# Patient Record
Sex: FEMALE | URBAN - METROPOLITAN AREA
[De-identification: names, ages, dates, MRNs, and addresses within clinical notes are randomized per-mention and may not be internally consistent; named-entity substitution may affect disease eponyms.]

---

## 2017-01-13 ENCOUNTER — APPOINTMENT (OUTPATIENT)
Dept: URBAN - METROPOLITAN AREA CLINIC 211 | Age: 52
Setting detail: DERMATOLOGY
End: 2017-01-13

## 2017-01-13 DIAGNOSIS — Z41.9 ENCOUNTER FOR PROCEDURE FOR PURPOSES OTHER THAN REMEDYING HEALTH STATE, UNSPECIFIED: ICD-10-CM

## 2017-01-13 DIAGNOSIS — L70.0 ACNE VULGARIS: ICD-10-CM

## 2017-01-13 PROCEDURE — OTHER OTHER (COSMETIC): OTHER

## 2017-01-13 PROCEDURE — OTHER ACNE SURGERY: OTHER

## 2017-01-13 PROCEDURE — OTHER MIPS QUALITY: OTHER

## 2017-01-13 PROCEDURE — 10040 ACNE SURGERY: CPT

## 2017-01-13 ASSESSMENT — LOCATION DETAILED DESCRIPTION DERM: LOCATION DETAILED: LEFT MEDIAL SUPERIOR CHEST

## 2017-01-13 ASSESSMENT — LOCATION ZONE DERM: LOCATION ZONE: TRUNK

## 2017-01-13 ASSESSMENT — LOCATION SIMPLE DESCRIPTION DERM: LOCATION SIMPLE: CHEST

## 2017-01-13 NOTE — PROCEDURE: ACNE SURGERY
Render Post-Care Instructions In Note?: no
Consent was obtained and risks were reviewed including but not limited to scarring, infection, bleeding, scabbing, incomplete removal, and allergy to anesthesia.
Extraction Method: lancet and extractor
Acne Type: Comedonal Lesions
Detail Level: Zone
Post-Care Instructions: I reviewed with the patient in detail post-care instructions. Patient is to keep the treatment areaas dry overnight, and then apply bacitracin twice daily until healed. Patient may apply hydrogen peroxide soaks to remove any crusting.
Prep Text (Optional): Prior to removal the treatment areas were prepped in the usual fashion.\\nAcne Surgery with Shahid Acleara\\nIndication: improvement of open and closed comedones, papules, pustules, acne cysts\\nTreatment #\\nSite(s):\\nenergy:\\nvac level:\\npulse mode:\\npulse width/ms:\\nTotal Passes:\\n\\nAcne surgery with Lancet and Comedone Extractor:   Yes\\nIntralesional Injections with Kenalog preparation:      No                                          \\nConcentration: N/A\\nTotal Volume: N/A\\nProducts Applied: Pore Therapy and Cetaphil Oil Control Moisturizer w/SPF30\\n\\n\\Kim indications, treatment expectations (including management of any possible irritations), protocols, risks and benefits, pre/post care are reviewed. Details of these can be found on the appropriate attached informed consent documentation. Patient understands that multiple treatments may be necessary for optimum results and that ongoing maintenance with at-home products and additional office visits or treatments may be needed to enhance and extend the desired results.\\nStandard protocol was done. Reviewed with patient that ILS injections as well as manual extractions may be recommended for optimum results. These will be considered prn.  Following treatment, the expected mild erythema was observed. Patient tolerated the procedure well without immediate bruising or complication. Post care was reviewed.\\nRTC:

## 2017-01-13 NOTE — HPI: OTHER
Condition:: Cosmetic
Please Describe Your Condition:: Pt presents today for CherryBerry Facial and Acleara. Pt had no issues or concerns from previous treatment. Pt's medical history and medications are reviewed today.

## 2017-01-13 NOTE — PROCEDURE: MIPS QUALITY
Quality 130: Documentation Of Current Medications In The Medical Record: Current Medications Documented
Quality 431: Preventive Care And Screening: Unhealthy Alcohol Use - Screening: Patient screened for unhealthy alcohol use using a single question and scores less than 2 times per year
Detail Level: Detailed
Quality 226: Preventive Care And Screening: Tobacco Use: Screening And Cessation Intervention: Patient screened for tobacco and never smoked
Quality 131: Pain Assessment And Follow-Up: Pain assessment documented as positive using a standardized tool AND a follow-up plan is documented
Quality 110: Preventive Care And Screening: Influenza Immunization: Influenza Immunization not Administered because Patient Refused.

## 2017-02-13 ENCOUNTER — APPOINTMENT (OUTPATIENT)
Dept: URBAN - METROPOLITAN AREA CLINIC 211 | Age: 52
Setting detail: DERMATOLOGY
End: 2017-02-13

## 2017-02-13 DIAGNOSIS — L98.8 OTHER SPECIFIED DISORDERS OF THE SKIN AND SUBCUTANEOUS TISSUE: ICD-10-CM

## 2017-02-13 DIAGNOSIS — Z41.9 ENCOUNTER FOR PROCEDURE FOR PURPOSES OTHER THAN REMEDYING HEALTH STATE, UNSPECIFIED: ICD-10-CM

## 2017-02-13 PROBLEM — L90.8 OTHER ATROPHIC DISORDERS OF SKIN: Status: ACTIVE | Noted: 2017-02-13

## 2017-02-13 PROCEDURE — OTHER MIPS QUALITY: OTHER

## 2017-02-13 PROCEDURE — OTHER BOTOX: OTHER

## 2017-02-13 PROCEDURE — OTHER COUNSELING: OTHER

## 2017-02-13 PROCEDURE — OTHER OTHER (COSMETIC): OTHER

## 2017-02-13 ASSESSMENT — LOCATION DETAILED DESCRIPTION DERM
LOCATION DETAILED: LEFT CENTRAL EYEBROW
LOCATION DETAILED: LEFT MEDIAL EYEBROW
LOCATION DETAILED: RIGHT INFERIOR FOREHEAD
LOCATION DETAILED: GLABELLA

## 2017-02-13 ASSESSMENT — LOCATION SIMPLE DESCRIPTION DERM
LOCATION SIMPLE: GLABELLA
LOCATION SIMPLE: RIGHT FOREHEAD
LOCATION SIMPLE: LEFT EYEBROW

## 2017-02-13 ASSESSMENT — LOCATION ZONE DERM: LOCATION ZONE: FACE

## 2017-02-13 NOTE — PROCEDURE: MIPS QUALITY
Quality 226: Preventive Care And Screening: Tobacco Use: Screening And Cessation Intervention: Patient screened for tobacco and never smoked
Quality 130: Documentation Of Current Medications In The Medical Record: Current Medications Documented
Quality 131: Pain Assessment And Follow-Up: Pain assessment documented as positive using a standardized tool AND a follow-up plan is documented
Quality 110: Preventive Care And Screening: Influenza Immunization: Influenza Immunization Administered during Influenza season
Detail Level: Detailed
Quality 431: Preventive Care And Screening: Unhealthy Alcohol Use - Screening: Patient screened for unhealthy alcohol use using a single question and scores less than 2 times per year

## 2017-02-13 NOTE — PROCEDURE: MIPS QUALITY
Quality 110: Preventive Care And Screening: Influenza Immunization: Influenza Immunization Administered during Influenza season
Quality 130: Documentation Of Current Medications In The Medical Record: Current Medications Documented
Quality 431: Preventive Care And Screening: Unhealthy Alcohol Use - Screening: Patient screened for unhealthy alcohol use using a single question and scores less than 2 times per year
Quality 131: Pain Assessment And Follow-Up: Pain assessment documented as positive using a standardized tool AND a follow-up plan is documented
Quality 226: Preventive Care And Screening: Tobacco Use: Screening And Cessation Intervention: Patient screened for tobacco and never smoked
Detail Level: Detailed

## 2017-02-13 NOTE — HPI: OTHER
Condition:: Cosmetic
Please Describe Your Condition:: Pt presents today for CherryBerry Facial and LHR 3/6 for bilat axillas. Pt's medical history and medications are reviewed today. \\nPain 0/10

## 2017-02-13 NOTE — PROCEDURE: OTHER (COSMETIC)
Detail Level: Zone
Other (Free Text): IPL utilizing\\nIndication: improvement of pigmentation, reduction of vascular lesions and hair\\nTreatment #\\nSite(s):\\nSettings:\\nMelanin Index=\\n@  ms value =   j -  j\\nApplied   ms @   j x 1 pass\\n\\n\\Kim indications, treatment expectations (including management of any possible irritations), protocols, risks and benefits, pre/post care are reviewed. Details of these can be found on the appropriate attached informed consent documentation. Patient understands that multiple treaments may be necessary for optimum results and that ongoing maintenance with at-home products and additional office visits or treatments may be needed to enhance and extend the desired results.\\n\\nStandard protocol was done. The eyes were covered with IPL specific eyeshields. Following treatment, the expected mild erythema and edema was observed. Post cooling with chilled roller was done and a moisturizing sunblock was applied. Patient tolerated the procedure well without immediate complication. Post care was reviewed and a follow up appointment was recommended.

## 2017-02-13 NOTE — PROCEDURE: BOTOX
Masseter Units: 0
Consent: Written consent obtained. Risks include but not limited to lid/brow ptosis, bruising, swelling, diplopia, temporary effect, incomplete chemical denervation, asymmetry, and need for multiple treatments for optimum results.  Female patients deny current pregnancy and/or breast feeding.
Detail Level: Simple
Glabellar Complex Units: 20
Dilution (U/0.1 Cc): 4
Post-Care Instructions: Patient instructed to not lie down and to limit physical activity for 4 hours. Patient instructed not to travel by airplane for 48 hours.

## 2017-03-10 ENCOUNTER — APPOINTMENT (OUTPATIENT)
Dept: URBAN - METROPOLITAN AREA CLINIC 211 | Age: 52
Setting detail: DERMATOLOGY
End: 2017-03-10

## 2017-03-10 DIAGNOSIS — L70.0 ACNE VULGARIS: ICD-10-CM

## 2017-03-10 DIAGNOSIS — Z41.9 ENCOUNTER FOR PROCEDURE FOR PURPOSES OTHER THAN REMEDYING HEALTH STATE, UNSPECIFIED: ICD-10-CM

## 2017-03-10 DIAGNOSIS — L72.8 OTHER FOLLICULAR CYSTS OF THE SKIN AND SUBCUTANEOUS TISSUE: ICD-10-CM

## 2017-03-10 PROCEDURE — OTHER OTHER (COSMETIC): OTHER

## 2017-03-10 PROCEDURE — OTHER INTRALESIONAL KENALOG: OTHER

## 2017-03-10 PROCEDURE — OTHER ACNE SURGERY: OTHER

## 2017-03-10 PROCEDURE — 10040 ACNE SURGERY: CPT

## 2017-03-10 PROCEDURE — OTHER MIPS QUALITY: OTHER

## 2017-03-10 PROCEDURE — 11900 INJECT SKIN LESIONS </W 7: CPT

## 2017-03-10 ASSESSMENT — LOCATION DETAILED DESCRIPTION DERM
LOCATION DETAILED: LEFT MEDIAL BREAST 7-8:00 REGION
LOCATION DETAILED: LEFT INFRAMAMMARY CREASE (OUTER QUADRANT)
LOCATION DETAILED: RIGHT LATERAL BREAST 6-7:00 REGION
LOCATION DETAILED: LEFT MEDIAL SUPERIOR CHEST

## 2017-03-10 ASSESSMENT — LOCATION SIMPLE DESCRIPTION DERM
LOCATION SIMPLE: RIGHT BREAST
LOCATION SIMPLE: LEFT BREAST
LOCATION SIMPLE: CHEST

## 2017-03-10 ASSESSMENT — LOCATION ZONE DERM: LOCATION ZONE: TRUNK

## 2017-03-10 NOTE — PROCEDURE: MIPS QUALITY
Quality 131: Pain Assessment And Follow-Up: Pain assessment documented as positive using a standardized tool AND a follow-up plan is documented
Detail Level: Detailed
Quality 400a: One-Time Screening For Hepatitis C Virus (Hcv) For All Patients: Documentation of patient reason(s) for not receiving one-time screening for HCV infection
Quality 431: Preventive Care And Screening: Unhealthy Alcohol Use - Screening: Patient screened for unhealthy alcohol use using a single question and scores less than 2 times per year
Quality 110: Preventive Care And Screening: Influenza Immunization: Influenza Immunization previously received during influenza season
Quality 226: Preventive Care And Screening: Tobacco Use: Screening And Cessation Intervention: Patient screened for tobacco and never smoked
Quality 130: Documentation Of Current Medications In The Medical Record: Current Medications Documented

## 2017-03-10 NOTE — HPI: OTHER
Condition:: Cosmetic
Please Describe Your Condition:: comes in for a chief complaint of Cosmetic. Pt present today for an acne surgery for underarms # 15, and chest, and a cherry berry facial. Pain is 0/10. Pt meds and allergies are the same, see EMA. Pt has not been screened for HCV, referred her to her PCP.

## 2017-03-10 NOTE — PROCEDURE: ACNE SURGERY
Prep Text (Optional): Prior to removal the treatment areas were prepped in the usual fashion.\\nAcne Surgery with Shahid Acleara\\nIndication: improvement of open and closed comedones, papules, pustules, acne cysts\\nTreatment #\\nSite(s):\\nenergy:\\nvac level:\\npulse mode:\\npulse width/ms:\\nTotal Passes:\\n\\nAcne surgery with Lancet and Comedone Extractor:   Yes\\nIntralesional Injections with Kenalog preparation:      No                                          \\nConcentration: N/A\\nTotal Volume: N/A\\nProducts Applied: Pore Therapy and Cetaphil Oil Control Moisturizer w/SPF30\\n\\n\\Kim indications, treatment expectations (including management of any possible irritations), protocols, risks and benefits, pre/post care are reviewed. Details of these can be found on the appropriate attached informed consent documentation. Patient understands that multiple treatments may be necessary for optimum results and that ongoing maintenance with at-home products and additional office visits or treatments may be needed to enhance and extend the desired results.\\nStandard protocol was done. Reviewed with patient that ILS injections as well as manual extractions may be recommended for optimum results. These will be considered prn.  Following treatment, the expected mild erythema was observed. Patient tolerated the procedure well without immediate bruising or complication. Post care was reviewed.\\nRTC:
Post-Care Instructions: I reviewed with the patient in detail post-care instructions. Patient is to keep the treatment areaas dry overnight, and then apply bacitracin twice daily until healed. Patient may apply hydrogen peroxide soaks to remove any crusting.
Extraction Method: lancet and extractor
Detail Level: Zone
Render Post-Care Instructions In Note?: no
Acne Type: Comedonal Lesions
Consent was obtained and risks were reviewed including but not limited to scarring, infection, bleeding, scabbing, incomplete removal, and allergy to anesthesia.

## 2017-03-10 NOTE — PROCEDURE: INTRALESIONAL KENALOG
Include Z78.9 (Other Specified Conditions Influencing Health Status) As An Associated Diagnosis?: No
Medical Necessity Clause: This procedure was medically necessary because the lesions that were treated were:
X Size Of Lesion In Cm (Optional): 0
Total Volume Injected (Ccs- Only Use Numbers And Decimals): 0.07
Detail Level: Simple
Consent: The risks of atrophy were reviewed with the patient.
Kenalog Preparation: Kenalog
Concentration Of Solution Injected (Mg/Ml): 3.0
Administered By (Optional): Heidi

## 2017-03-27 ENCOUNTER — APPOINTMENT (OUTPATIENT)
Dept: URBAN - METROPOLITAN AREA CLINIC 211 | Age: 52
Setting detail: DERMATOLOGY
End: 2017-03-27

## 2017-03-27 DIAGNOSIS — Z41.9 ENCOUNTER FOR PROCEDURE FOR PURPOSES OTHER THAN REMEDYING HEALTH STATE, UNSPECIFIED: ICD-10-CM

## 2017-03-27 DIAGNOSIS — L70.0 ACNE VULGARIS: ICD-10-CM

## 2017-03-27 PROBLEM — L57.0 ACTINIC KERATOSIS: Status: ACTIVE | Noted: 2017-03-27

## 2017-03-27 PROCEDURE — OTHER ACNE SURGERY: OTHER

## 2017-03-27 PROCEDURE — OTHER OTHER (COSMETIC): OTHER

## 2017-03-27 PROCEDURE — OTHER MIPS QUALITY: OTHER

## 2017-03-27 PROCEDURE — 10040 ACNE SURGERY: CPT

## 2017-03-27 ASSESSMENT — LOCATION SIMPLE DESCRIPTION DERM: LOCATION SIMPLE: CHEST

## 2017-03-27 ASSESSMENT — LOCATION ZONE DERM: LOCATION ZONE: TRUNK

## 2017-03-27 ASSESSMENT — LOCATION DETAILED DESCRIPTION DERM: LOCATION DETAILED: LEFT MEDIAL SUPERIOR CHEST

## 2017-03-27 NOTE — PROCEDURE: OTHER (COSMETIC)
Other (Free Text): IPL utilizing\\nIndication: improvement of pigmentation, reduction of vascular lesions and hair\\nTreatment #\\nSite(s):\\nSettings:\\nMelanin Index=\\n@  ms value =   j -  j\\nApplied   ms @   j x 1 pass\\n\\n\\Kim indications, treatment expectations (including management of any possible irritations), protocols, risks and benefits, pre/post care are reviewed. Details of these can be found on the appropriate attached informed consent documentation. Patient understands that multiple treaments may be necessary for optimum results and that ongoing maintenance with at-home products and additional office visits or treatments may be needed to enhance and extend the desired results.\\n\\nStandard protocol was done. The eyes were covered with IPL specific eyeshields. Following treatment, the expected mild erythema and edema was observed. Post cooling with chilled roller was done and a moisturizing sunblock was applied. Patient tolerated the procedure well without immediate complication. Post care was reviewed and a follow up appointment was recommended.
Detail Level: Zone
Other (Free Text): Silk Peel Dermalinfusion\\n\\nTreatment #\\nTreatment Site:\\nSelected Serum:\\nGrit Size:\\nPSI:\\nPost procedure products:\\nNotes:\\n\\n\\Kim indications, treatment expectations (including management of any possible irritations), protocols, risks and benefits, pre/post care are reviewed. Details of these can be found on the appropriate attached informed consent documentation. Patient understands that multiple treatments may be necessary for optimum results and that ongoing maintenance with at-home products and additional office visits or treatments may be needed to enhance and extend the desired results.\\nStandard protocol was done. The eyes were covered with specific eyeshields. Following treatment, the expected mild erythema was observed. Patient tolerated the procedure well without immediate complication. Post care was reviewed and a follow up appointment was recommended.\\n\\nAnswered all questions\\nRTC:

## 2017-03-27 NOTE — PROCEDURE: ACNE SURGERY
Render Post-Care Instructions In Note?: no
Consent was obtained and risks were reviewed including but not limited to scarring, infection, bleeding, scabbing, incomplete removal, and allergy to anesthesia.
Prep Text (Optional): Prior to removal the treatment areas were prepped in the usual fashion.\\nAcne Surgery with Shahid Acleara\\nIndication: improvement of open and closed comedones, papules, pustules, acne cysts\\nTreatment #\\nSite(s):\\nenergy:\\nvac level:\\npulse mode:\\npulse width/ms:\\nTotal Passes:\\n\\nAcne surgery with Lancet and Comedone Extractor:   Yes\\nIntralesional Injections with Kenalog preparation:      No                                          \\nConcentration: N/A\\nTotal Volume: N/A\\nProducts Applied: Pore Therapy and Cetaphil Oil Control Moisturizer w/SPF30\\n\\n\\Kim indications, treatment expectations (including management of any possible irritations), protocols, risks and benefits, pre/post care are reviewed. Details of these can be found on the appropriate attached informed consent documentation. Patient understands that multiple treatments may be necessary for optimum results and that ongoing maintenance with at-home products and additional office visits or treatments may be needed to enhance and extend the desired results.\\nStandard protocol was done. Reviewed with patient that ILS injections as well as manual extractions may be recommended for optimum results. These will be considered prn.  Following treatment, the expected mild erythema was observed. Patient tolerated the procedure well without immediate bruising or complication. Post care was reviewed.\\nRTC:
Post-Care Instructions: I reviewed with the patient in detail post-care instructions. Patient is to keep the treatment areaas dry overnight, and then apply bacitracin twice daily until healed. Patient may apply hydrogen peroxide soaks to remove any crusting.
Acne Type: Comedonal Lesions
Extraction Method: lancet and extractor
Detail Level: Zone

## 2017-03-27 NOTE — HPI: OTHER
Condition:: Acne
Please Describe Your Condition:: Pt presents today for Acleara tx #16. Pt feels she is starting to see improvement overall.
Condition:: Cosmetic
Please Describe Your Condition:: Pt presents today for CherryBerry Facial # 8 and LHR # 3/6 for the underarms. Pt has notified nice improvement since starting the LHR. Pt's medical history and medications are reviewed today. \\nPain 0/10.

## 2017-03-27 NOTE — PROCEDURE: MIPS QUALITY
Quality 131: Pain Assessment And Follow-Up: Pain assessment documented as positive using a standardized tool AND a follow-up plan is documented
Quality 431: Preventive Care And Screening: Unhealthy Alcohol Use - Screening: Patient screened for unhealthy alcohol use using a single question and scores less than 2 times per year
Detail Level: Detailed
Quality 226: Preventive Care And Screening: Tobacco Use: Screening And Cessation Intervention: Patient screened for tobacco and never smoked
Quality 130: Documentation Of Current Medications In The Medical Record: Current Medications Documented
Quality 110: Preventive Care And Screening: Influenza Immunization: Influenza Immunization previously received during influenza season
Quality 400a: One-Time Screening For Hepatitis C Virus (Hcv) For All Patients: Documentation of patient reason(s) for not receiving one-time screening for HCV infection

## 2017-03-31 ENCOUNTER — APPOINTMENT (OUTPATIENT)
Dept: URBAN - METROPOLITAN AREA CLINIC 211 | Age: 52
Setting detail: DERMATOLOGY
End: 2017-04-05

## 2017-03-31 DIAGNOSIS — L72.8 OTHER FOLLICULAR CYSTS OF THE SKIN AND SUBCUTANEOUS TISSUE: ICD-10-CM

## 2017-03-31 DIAGNOSIS — L73.2 HIDRADENITIS SUPPURATIVA: ICD-10-CM

## 2017-03-31 PROBLEM — L85.3 XEROSIS CUTIS: Status: ACTIVE | Noted: 2017-03-31

## 2017-03-31 PROCEDURE — OTHER COUNSELING: OTHER

## 2017-03-31 PROCEDURE — OTHER MIPS QUALITY: OTHER

## 2017-03-31 PROCEDURE — 99213 OFFICE O/P EST LOW 20 MIN: CPT | Mod: 25,24

## 2017-03-31 PROCEDURE — OTHER INTRALESIONAL KENALOG: OTHER

## 2017-03-31 PROCEDURE — 11900 INJECT SKIN LESIONS </W 7: CPT

## 2017-03-31 PROCEDURE — OTHER TREATMENT REGIMEN: OTHER

## 2017-03-31 PROCEDURE — OTHER PRESCRIPTION: OTHER

## 2017-03-31 RX ORDER — CLINDAMYCIN PHOSPHATE 1 %
GEL (GRAM) TOPICAL
Qty: 1 | Refills: 11 | Status: ERX | COMMUNITY
Start: 2017-03-31

## 2017-03-31 RX ORDER — DOXYCYCLINE HYCLATE 150 MG/1
TABLET, DELAYED RELEASE ORAL
Qty: 30 | Refills: 1 | Status: ERX

## 2017-03-31 ASSESSMENT — LOCATION DETAILED DESCRIPTION DERM
LOCATION DETAILED: RIGHT AXILLARY VAULT
LOCATION DETAILED: LEFT AXILLARY VAULT

## 2017-03-31 ASSESSMENT — LOCATION SIMPLE DESCRIPTION DERM
LOCATION SIMPLE: RIGHT AXILLARY VAULT
LOCATION SIMPLE: LEFT AXILLARY VAULT

## 2017-03-31 ASSESSMENT — LOCATION ZONE DERM: LOCATION ZONE: AXILLAE

## 2017-03-31 NOTE — PROCEDURE: INTRALESIONAL KENALOG
Include Z78.9 (Other Specified Conditions Influencing Health Status) As An Associated Diagnosis?: No
Consent: The risks of atrophy were reviewed with the patient.
Detail Level: Detailed
X Size Of Lesion In Cm (Optional): 0
Total Volume Injected (Ccs- Only Use Numbers And Decimals): 0.4
Kenalog Preparation: Kenalog
Administered By (Optional): CATRINA
Concentration Of Solution Injected (Mg/Ml): 3.0
Medical Necessity Clause: This procedure was medically necessary because the lesions that were treated were:

## 2017-03-31 NOTE — PROCEDURE: MIPS QUALITY
Detail Level: Detailed
Quality 131: Pain Assessment And Follow-Up: Pain assessment documented as positive using a standardized tool AND a follow-up plan is documented
Quality 130: Documentation Of Current Medications In The Medical Record: Current Medications Documented
Quality 400a: One-Time Screening For Hepatitis C Virus (Hcv) For All Patients: Documentation of patient reason(s) for not receiving one-time screening for HCV infection
Quality 226: Preventive Care And Screening: Tobacco Use: Screening And Cessation Intervention: Patient screened for tobacco and never smoked
Quality 431: Preventive Care And Screening: Unhealthy Alcohol Use - Screening: Patient screened for unhealthy alcohol use using a single question and scores less than 2 times per year
Quality 110: Preventive Care And Screening: Influenza Immunization: Influenza Immunization previously received during influenza season

## 2017-04-10 ENCOUNTER — APPOINTMENT (OUTPATIENT)
Dept: URBAN - METROPOLITAN AREA CLINIC 211 | Age: 52
Setting detail: DERMATOLOGY
End: 2017-04-10

## 2017-04-10 ENCOUNTER — RX ONLY (RX ONLY)
Age: 52
End: 2017-04-10

## 2017-04-10 DIAGNOSIS — L70.0 ACNE VULGARIS: ICD-10-CM

## 2017-04-10 DIAGNOSIS — Z41.9 ENCOUNTER FOR PROCEDURE FOR PURPOSES OTHER THAN REMEDYING HEALTH STATE, UNSPECIFIED: ICD-10-CM

## 2017-04-10 PROCEDURE — OTHER ACNE SURGERY: OTHER

## 2017-04-10 PROCEDURE — 10040 ACNE SURGERY: CPT

## 2017-04-10 PROCEDURE — OTHER OTHER (COSMETIC): OTHER

## 2017-04-10 PROCEDURE — OTHER MIPS QUALITY: OTHER

## 2017-04-10 RX ORDER — CLINDAMYCIN PHOSPHATE 10 MG/ML
SOLUTION TOPICAL
Qty: 1 | Refills: 11 | Status: ERX | COMMUNITY
Start: 2017-04-10

## 2017-04-10 RX ORDER — DOXYCYCLINE HYCLATE 150 MG/1
TABLET, DELAYED RELEASE ORAL
Qty: 30 | Refills: 1 | Status: ERX

## 2017-04-10 RX ORDER — CLINDAMYCIN PHOSPHATE 1 %
GEL (GRAM) TOPICAL
Qty: 1 | Refills: 11 | Status: ERX

## 2017-04-10 ASSESSMENT — LOCATION DETAILED DESCRIPTION DERM: LOCATION DETAILED: LEFT MEDIAL SUPERIOR CHEST

## 2017-04-10 ASSESSMENT — LOCATION SIMPLE DESCRIPTION DERM: LOCATION SIMPLE: CHEST

## 2017-04-10 ASSESSMENT — LOCATION ZONE DERM: LOCATION ZONE: TRUNK

## 2017-04-10 NOTE — PROCEDURE: MIPS QUALITY
Quality 226: Preventive Care And Screening: Tobacco Use: Screening And Cessation Intervention: Patient screened for tobacco and never smoked
Quality 130: Documentation Of Current Medications In The Medical Record: Current Medications Documented
Detail Level: Detailed
Quality 110: Preventive Care And Screening: Influenza Immunization: Influenza Immunization previously received during influenza season
Quality 431: Preventive Care And Screening: Unhealthy Alcohol Use - Screening: Patient screened for unhealthy alcohol use using a single question and scores less than 2 times per year
Quality 131: Pain Assessment And Follow-Up: Pain assessment documented as positive using a standardized tool AND a follow-up plan is documented
Quality 400a: One-Time Screening For Hepatitis C Virus (Hcv) For All Patients: Documentation of patient reason(s) for not receiving one-time screening for HCV infection

## 2017-04-10 NOTE — PROCEDURE: ACNE SURGERY
Detail Level: Zone
Extraction Method: lancet and extractor
Post-Care Instructions: I reviewed with the patient in detail post-care instructions. Patient is to keep the treatment areaas dry overnight, and then apply bacitracin twice daily until healed. Patient may apply hydrogen peroxide soaks to remove any crusting.
Prep Text (Optional): Prior to removal the treatment areas were prepped in the usual fashion.\\nAcne Surgery with Shahid Acleara\\nIndication: improvement of open and closed comedones, papules, pustules, acne cysts\\nTreatment #\\nSite(s):\\nenergy:\\nvac level:\\npulse mode:\\npulse width/ms:\\nTotal Passes:\\n\\nAcne surgery with Lancet and Comedone Extractor:   Yes\\nIntralesional Injections with Kenalog preparation:      No                                          \\nConcentration: N/A\\nTotal Volume: N/A\\nProducts Applied: Pore Therapy and Cetaphil Oil Control Moisturizer w/SPF30\\n\\n\\Kim indications, treatment expectations (including management of any possible irritations), protocols, risks and benefits, pre/post care are reviewed. Details of these can be found on the appropriate attached informed consent documentation. Patient understands that multiple treatments may be necessary for optimum results and that ongoing maintenance with at-home products and additional office visits or treatments may be needed to enhance and extend the desired results.\\nStandard protocol was done. Reviewed with patient that ILS injections as well as manual extractions may be recommended for optimum results. These will be considered prn.  Following treatment, the expected mild erythema was observed. Patient tolerated the procedure well without immediate bruising or complication. Post care was reviewed.\\nRTC:
Render Post-Care Instructions In Note?: no
Consent was obtained and risks were reviewed including but not limited to scarring, infection, bleeding, scabbing, incomplete removal, and allergy to anesthesia.
Acne Type: Comedonal Lesions

## 2017-04-10 NOTE — PROCEDURE: OTHER (COSMETIC)
Other (Free Text): Silk Peel Dermalinfusion\\n\\nTreatment #\\nTreatment Site:\\nSelected Serum:\\nGrit Size:\\nPSI:\\nPost procedure products:\\nNotes:\\n\\n\\Kim indications, treatment expectations (including management of any possible irritations), protocols, risks and benefits, pre/post care are reviewed. Details of these can be found on the appropriate attached informed consent documentation. Patient understands that multiple treatments may be necessary for optimum results and that ongoing maintenance with at-home products and additional office visits or treatments may be needed to enhance and extend the desired results.\\nStandard protocol was done. The eyes were covered with specific eyeshields. Following treatment, the expected mild erythema was observed. Patient tolerated the procedure well without immediate complication. Post care was reviewed and a follow up appointment was recommended.\\n\\nAnswered all questions\\nRTC:
Detail Level: Zone

## 2017-04-10 NOTE — HPI: OTHER
Condition:: Cosmetic
Please Describe Your Condition:: Pt presents today for Acleara #17 and SilkPeel #1. Pain is 0/10\\nMed hx and allergies reviewed with no changes. Pt has not been screened for \\nHCV, recommended a visit to PCP for further details.

## 2017-05-08 ENCOUNTER — APPOINTMENT (OUTPATIENT)
Dept: URBAN - METROPOLITAN AREA CLINIC 211 | Age: 52
Setting detail: DERMATOLOGY
End: 2017-05-10

## 2017-05-08 DIAGNOSIS — Z41.9 ENCOUNTER FOR PROCEDURE FOR PURPOSES OTHER THAN REMEDYING HEALTH STATE, UNSPECIFIED: ICD-10-CM

## 2017-05-08 PROCEDURE — OTHER OTHER (COSMETIC): OTHER

## 2017-05-08 PROCEDURE — OTHER MIPS QUALITY: OTHER

## 2017-05-08 NOTE — HPI: OTHER
Condition:: Cosmetic
Please Describe Your Condition:: Pt presents today for Claire Ramos facial and LHR ua # 5. Pain is 0/10. Pt med hx and allergies reviewed with no changes. Pt has been has not been screened for HCV, recommended a visit to PCP for further details.

## 2017-05-08 NOTE — PROCEDURE: MIPS QUALITY
Quality 226: Preventive Care And Screening: Tobacco Use: Screening And Cessation Intervention: Patient screened for tobacco and never smoked
Quality 431: Preventive Care And Screening: Unhealthy Alcohol Use - Screening: Patient screened for unhealthy alcohol use using a single question and scores less than 2 times per year
Quality 400a: One-Time Screening For Hepatitis C Virus (Hcv) For All Patients: Documentation of patient reason(s) for not receiving one-time screening for HCV infection
Quality 130: Documentation Of Current Medications In The Medical Record: Current Medications Documented
Detail Level: Detailed
Quality 131: Pain Assessment And Follow-Up: Pain assessment documented as positive using a standardized tool AND a follow-up plan is documented
Quality 110: Preventive Care And Screening: Influenza Immunization: Influenza Immunization previously received during influenza season

## 2017-05-22 ENCOUNTER — APPOINTMENT (OUTPATIENT)
Dept: URBAN - METROPOLITAN AREA CLINIC 211 | Age: 52
Setting detail: DERMATOLOGY
End: 2017-05-31

## 2017-05-22 DIAGNOSIS — L72.8 OTHER FOLLICULAR CYSTS OF THE SKIN AND SUBCUTANEOUS TISSUE: ICD-10-CM

## 2017-05-22 DIAGNOSIS — Z41.9 ENCOUNTER FOR PROCEDURE FOR PURPOSES OTHER THAN REMEDYING HEALTH STATE, UNSPECIFIED: ICD-10-CM

## 2017-05-22 DIAGNOSIS — L70.0 ACNE VULGARIS: ICD-10-CM

## 2017-05-22 PROBLEM — L85.3 XEROSIS CUTIS: Status: ACTIVE | Noted: 2017-05-22

## 2017-05-22 PROCEDURE — OTHER OTHER (COSMETIC): OTHER

## 2017-05-22 PROCEDURE — OTHER MIPS QUALITY: OTHER

## 2017-05-22 PROCEDURE — OTHER ACNE SURGERY: OTHER

## 2017-05-22 PROCEDURE — OTHER INTRALESIONAL KENALOG: OTHER

## 2017-05-22 PROCEDURE — 11900 INJECT SKIN LESIONS </W 7: CPT

## 2017-05-22 PROCEDURE — 10040 ACNE SURGERY: CPT

## 2017-05-22 ASSESSMENT — LOCATION SIMPLE DESCRIPTION DERM
LOCATION SIMPLE: CHEST
LOCATION SIMPLE: RIGHT BREAST

## 2017-05-22 ASSESSMENT — LOCATION DETAILED DESCRIPTION DERM
LOCATION DETAILED: LEFT MEDIAL SUPERIOR CHEST
LOCATION DETAILED: RIGHT INFRAMAMMARY CREASE (INNER QUADRANT)

## 2017-05-22 ASSESSMENT — LOCATION ZONE DERM: LOCATION ZONE: TRUNK

## 2017-05-22 NOTE — HPI: OTHER
Condition:: Acne
Please Describe Your Condition:: Pt presents today fr Acleara # 18 and Alethea Ramos. Pain os 0/10. Med hx and allergies are reviewed with no changes. Pt has not been screened for HCV, recommended a visit to PCP for further details.

## 2017-05-22 NOTE — PROCEDURE: MIPS QUALITY
Quality 130: Documentation Of Current Medications In The Medical Record: Current Medications Documented
Quality 400a: One-Time Screening For Hepatitis C Virus (Hcv) For All Patients: Documentation of patient reason(s) for not receiving one-time screening for HCV infection
Quality 431: Preventive Care And Screening: Unhealthy Alcohol Use - Screening: Patient screened for unhealthy alcohol use using a single question and scores less than 2 times per year
Quality 226: Preventive Care And Screening: Tobacco Use: Screening And Cessation Intervention: Patient screened for tobacco and never smoked
Detail Level: Detailed
Quality 110: Preventive Care And Screening: Influenza Immunization: Influenza Immunization previously received during influenza season
Quality 131: Pain Assessment And Follow-Up: Pain assessment documented as positive using a standardized tool AND a follow-up plan is documented

## 2017-05-22 NOTE — PROCEDURE: INTRALESIONAL KENALOG
Total Volume Injected (Ccs- Only Use Numbers And Decimals): 0.05
Include Z78.9 (Other Specified Conditions Influencing Health Status) As An Associated Diagnosis?: No
Concentration Of Solution Injected (Mg/Ml): 3.0
X Size Of Lesion In Cm (Optional): 0
Consent: The risks of atrophy were reviewed with the patient.
Medical Necessity Clause: This procedure was medically necessary because the lesions that were treated were:
Detail Level: Simple
Administered By (Optional): Heidi
Kenalog Preparation: Kenalog

## 2017-05-22 NOTE — PROCEDURE: ACNE SURGERY
Acne Type: Comedonal Lesions
Prep Text (Optional): Prior to removal the treatment areas were prepped in the usual fashion.\\nAcne Surgery with Shahid Acleara\\nIndication: improvement of open and closed comedones, papules, pustules, acne cysts\\nTreatment #\\nSite(s):\\nenergy:\\nvac level:\\npulse mode:\\npulse width/ms:\\nTotal Passes:\\n\\nAcne surgery with Lancet and Comedone Extractor:   Yes\\nIntralesional Injections with Kenalog preparation:      No                                          \\nConcentration: N/A\\nTotal Volume: N/A\\nProducts Applied: Pore Therapy and Cetaphil Oil Control Moisturizer w/SPF30\\n\\n\\Kim indications, treatment expectations (including management of any possible irritations), protocols, risks and benefits, pre/post care are reviewed. Details of these can be found on the appropriate attached informed consent documentation. Patient understands that multiple treatments may be necessary for optimum results and that ongoing maintenance with at-home products and additional office visits or treatments may be needed to enhance and extend the desired results.\\nStandard protocol was done. Reviewed with patient that ILS injections as well as manual extractions may be recommended for optimum results. These will be considered prn.  Following treatment, the expected mild erythema was observed. Patient tolerated the procedure well without immediate bruising or complication. Post care was reviewed.\\nRTC:
Extraction Method: lancet and extractor
Consent was obtained and risks were reviewed including but not limited to scarring, infection, bleeding, scabbing, incomplete removal, and allergy to anesthesia.
Detail Level: Zone
Post-Care Instructions: I reviewed with the patient in detail post-care instructions. Patient is to keep the treatment areaas dry overnight, and then apply bacitracin twice daily until healed. Patient may apply hydrogen peroxide soaks to remove any crusting.
Render Number Of Lesions Treated: no

## 2017-07-14 ENCOUNTER — APPOINTMENT (OUTPATIENT)
Dept: URBAN - METROPOLITAN AREA CLINIC 211 | Age: 52
Setting detail: DERMATOLOGY
End: 2017-07-17

## 2017-07-14 DIAGNOSIS — Z41.9 ENCOUNTER FOR PROCEDURE FOR PURPOSES OTHER THAN REMEDYING HEALTH STATE, UNSPECIFIED: ICD-10-CM

## 2017-07-14 PROBLEM — L70.0 ACNE VULGARIS: Status: ACTIVE | Noted: 2017-07-14

## 2017-07-14 PROCEDURE — OTHER OTHER (COSMETIC): OTHER

## 2017-07-14 PROCEDURE — OTHER MIPS QUALITY: OTHER

## 2017-07-14 NOTE — PROCEDURE: MIPS QUALITY
Quality 131: Pain Assessment And Follow-Up: Pain assessment documented as positive using a standardized tool AND a follow-up plan is documented
Quality 431: Preventive Care And Screening: Unhealthy Alcohol Use - Screening: Patient screened for unhealthy alcohol use using a single question and scores less than 2 times per year
Quality 130: Documentation Of Current Medications In The Medical Record: Current Medications Documented
Quality 400a: One-Time Screening For Hepatitis C Virus (Hcv) For All Patients: Documentation of patient reason(s) for not receiving one-time screening for HCV infection
Quality 226: Preventive Care And Screening: Tobacco Use: Screening And Cessation Intervention: Patient screened for tobacco and never smoked
Quality 110: Preventive Care And Screening: Influenza Immunization: Influenza Immunization previously received during influenza season
Detail Level: Detailed

## 2017-07-24 ENCOUNTER — APPOINTMENT (OUTPATIENT)
Dept: URBAN - METROPOLITAN AREA CLINIC 211 | Age: 52
Setting detail: DERMATOLOGY
End: 2017-07-26

## 2017-07-24 DIAGNOSIS — L72.8 OTHER FOLLICULAR CYSTS OF THE SKIN AND SUBCUTANEOUS TISSUE: ICD-10-CM

## 2017-07-24 DIAGNOSIS — L70.0 ACNE VULGARIS: ICD-10-CM

## 2017-07-24 DIAGNOSIS — Z41.9 ENCOUNTER FOR PROCEDURE FOR PURPOSES OTHER THAN REMEDYING HEALTH STATE, UNSPECIFIED: ICD-10-CM

## 2017-07-24 PROCEDURE — OTHER ACNE SURGERY: OTHER

## 2017-07-24 PROCEDURE — OTHER OTHER (COSMETIC): OTHER

## 2017-07-24 PROCEDURE — OTHER INTRALESIONAL KENALOG: OTHER

## 2017-07-24 PROCEDURE — OTHER MIPS QUALITY: OTHER

## 2017-07-24 PROCEDURE — 11900 INJECT SKIN LESIONS </W 7: CPT

## 2017-07-24 PROCEDURE — 10040 ACNE SURGERY: CPT

## 2017-07-24 ASSESSMENT — LOCATION ZONE DERM
LOCATION ZONE: TRUNK
LOCATION ZONE: LIP

## 2017-07-24 ASSESSMENT — LOCATION SIMPLE DESCRIPTION DERM
LOCATION SIMPLE: CHEST
LOCATION SIMPLE: RIGHT LIP

## 2017-07-24 ASSESSMENT — LOCATION DETAILED DESCRIPTION DERM
LOCATION DETAILED: LEFT MEDIAL SUPERIOR CHEST
LOCATION DETAILED: RIGHT UPPER CUTANEOUS LIP

## 2017-07-24 NOTE — HPI: OTHER
Condition:: Cosmetic
Please Describe Your Condition:: Pt presents today for Acleara and SilkPeel. Pain is0/10. Med hx and allergies reviewed with no changes. Pt has not been screened for HCV, recommended a visit to PCP for further details.

## 2017-07-24 NOTE — PROCEDURE: ACNE SURGERY
Prep Text (Optional): Prior to removal the treatment areas were prepped in the usual fashion.\\nAcne Surgery with Shahid Acleara\\nIndication: improvement of open and closed comedones, papules, pustules, acne cysts\\nTreatment #\\nSite(s):\\nenergy:\\nvac level:\\npulse mode:\\npulse width/ms:\\nTotal Passes:\\n\\nAcne surgery with Lancet and Comedone Extractor:   Yes\\nIntralesional Injections with Kenalog preparation:      No                                          \\nConcentration: N/A\\nTotal Volume: N/A\\nProducts Applied: Pore Therapy and Cetaphil Oil Control Moisturizer w/SPF30\\n\\n\\Kim indications, treatment expectations (including management of any possible irritations), protocols, risks and benefits, pre/post care are reviewed. Details of these can be found on the appropriate attached informed consent documentation. Patient understands that multiple treatments may be necessary for optimum results and that ongoing maintenance with at-home products and additional office visits or treatments may be needed to enhance and extend the desired results.\\nStandard protocol was done. Reviewed with patient that ILS injections as well as manual extractions may be recommended for optimum results. These will be considered prn.  Following treatment, the expected mild erythema was observed. Patient tolerated the procedure well without immediate bruising or complication. Post care was reviewed.\\nRTC:
Render Post-Care Instructions In Note?: no
Detail Level: Zone
Extraction Method: lancet and extractor
Consent was obtained and risks were reviewed including but not limited to scarring, infection, bleeding, scabbing, incomplete removal, and allergy to anesthesia.
Acne Type: Comedonal Lesions
Post-Care Instructions: I reviewed with the patient in detail post-care instructions. Patient is to keep the treatment areaas dry overnight, and then apply bacitracin twice daily until healed. Patient may apply hydrogen peroxide soaks to remove any crusting.

## 2017-07-24 NOTE — PROCEDURE: INTRALESIONAL KENALOG
Include Z78.9 (Other Specified Conditions Influencing Health Status) As An Associated Diagnosis?: No
Consent: The risks of atrophy were reviewed with the patient.
Total Volume Injected (Ccs- Only Use Numbers And Decimals): 0.05
Concentration Of Solution Injected (Mg/Ml): 3.0
Kenalog Preparation: Kenalog
Administered By (Optional): Heidi
Medical Necessity Clause: This procedure was medically necessary because the lesions that were treated were:
Detail Level: Simple
X Size Of Lesion In Cm (Optional): 0

## 2017-07-24 NOTE — PROCEDURE: MIPS QUALITY
Quality 131: Pain Assessment And Follow-Up: Pain assessment documented as positive using a standardized tool AND a follow-up plan is documented
Quality 130: Documentation Of Current Medications In The Medical Record: Current Medications Documented
Quality 400a: One-Time Screening For Hepatitis C Virus (Hcv) For All Patients: Documentation of patient reason(s) for not receiving one-time screening for HCV infection
Quality 110: Preventive Care And Screening: Influenza Immunization: Influenza Immunization previously received during influenza season
Detail Level: Detailed
Quality 431: Preventive Care And Screening: Unhealthy Alcohol Use - Screening: Patient screened for unhealthy alcohol use using a single question and scores less than 2 times per year
Quality 226: Preventive Care And Screening: Tobacco Use: Screening And Cessation Intervention: Patient screened for tobacco and never smoked

## 2017-07-28 ENCOUNTER — APPOINTMENT (OUTPATIENT)
Dept: URBAN - METROPOLITAN AREA CLINIC 211 | Age: 52
Setting detail: DERMATOLOGY
End: 2017-07-28

## 2017-07-28 DIAGNOSIS — Z41.9 ENCOUNTER FOR PROCEDURE FOR PURPOSES OTHER THAN REMEDYING HEALTH STATE, UNSPECIFIED: ICD-10-CM

## 2017-07-28 PROCEDURE — OTHER OTHER (COSMETIC): OTHER

## 2017-07-28 PROCEDURE — OTHER MIPS QUALITY: OTHER

## 2017-07-28 NOTE — PROCEDURE: OTHER (COSMETIC)
Other (Free Text): IPL utilizing\\nIndication: improvement of pigmentation, reduction of vascular lesions and hair\\nTreatment #\\nSite(s):\\nSettings:\\nMelanin Index=\\n@  ms value =   j -  j\\nApplied   ms @   j x 1 pass\\n\\n\\Kim indications, treatment expectations (including management of any possible irritations), protocols, risks and benefits, pre/post care are reviewed. Details of these can be found on the appropriate attached informed consent documentation. Patient understands that multiple treaments may be necessary for optimum results and that ongoing maintenance with at-home products and additional office visits or treatments may be needed to enhance and extend the desired results.\\n\\nStandard protocol was done. The eyes were covered with IPL specific eyeshields. Following treatment, the expected mild erythema and edema was observed. Post cooling with chilled roller was done and a moisturizing sunblock was applied. Patient tolerated the procedure well without immediate complication. Post care was reviewed and a follow up appointment was recommended.
Detail Level: Zone

## 2017-07-28 NOTE — HPI: OTHER
Condition:: Cosmetic
Please Describe Your Condition:: Pt presents today for LHR # 6. Pain is 0/10. Pt med hx and allergies have been reviewed with no changes. Pt has not screened for HCV recommended a visit to PCP for further details.

## 2017-07-28 NOTE — PROCEDURE: MIPS QUALITY
Quality 110: Preventive Care And Screening: Influenza Immunization: Influenza Immunization previously received during influenza season
Quality 130: Documentation Of Current Medications In The Medical Record: Current Medications Documented
Quality 226: Preventive Care And Screening: Tobacco Use: Screening And Cessation Intervention: Patient screened for tobacco and never smoked
Quality 131: Pain Assessment And Follow-Up: Pain assessment documented as positive using a standardized tool AND a follow-up plan is documented
Quality 400a: One-Time Screening For Hepatitis C Virus (Hcv) For All Patients: Documentation of patient reason(s) for not receiving one-time screening for HCV infection
Quality 431: Preventive Care And Screening: Unhealthy Alcohol Use - Screening: Patient screened for unhealthy alcohol use using a single question and scores less than 2 times per year
Detail Level: Detailed

## 2017-08-01 ENCOUNTER — APPOINTMENT (OUTPATIENT)
Dept: URBAN - METROPOLITAN AREA CLINIC 211 | Age: 52
Setting detail: DERMATOLOGY
End: 2017-08-03

## 2017-08-01 DIAGNOSIS — L0391 CELLULITIS AND ABSCESS OF UNSPECIFIED SITES: ICD-10-CM

## 2017-08-01 DIAGNOSIS — L70.0 ACNE VULGARIS: ICD-10-CM

## 2017-08-01 DIAGNOSIS — L0390 CELLULITIS AND ABSCESS OF UNSPECIFIED SITES: ICD-10-CM

## 2017-08-01 PROBLEM — L85.3 XEROSIS CUTIS: Status: ACTIVE | Noted: 2017-08-01

## 2017-08-01 PROBLEM — L02.212 CUTANEOUS ABSCESS OF BACK [ANY PART, EXCEPT BUTTOCK]: Status: ACTIVE | Noted: 2017-08-01

## 2017-08-01 PROCEDURE — 10060 I&D ABSCESS SIMPLE/SINGLE: CPT | Mod: 79

## 2017-08-01 PROCEDURE — OTHER INCISION AND DRAINAGE: OTHER

## 2017-08-01 PROCEDURE — 11900 INJECT SKIN LESIONS </W 7: CPT

## 2017-08-01 PROCEDURE — 99212 OFFICE O/P EST SF 10 MIN: CPT | Mod: 25,24

## 2017-08-01 PROCEDURE — OTHER COUNSELING: OTHER

## 2017-08-01 PROCEDURE — OTHER MIPS QUALITY: OTHER

## 2017-08-01 PROCEDURE — OTHER TREATMENT REGIMEN: OTHER

## 2017-08-01 PROCEDURE — OTHER INTRALESIONAL KENALOG: OTHER

## 2017-08-01 ASSESSMENT — LOCATION ZONE DERM: LOCATION ZONE: TRUNK

## 2017-08-01 ASSESSMENT — LOCATION DETAILED DESCRIPTION DERM: LOCATION DETAILED: LEFT SUPERIOR LATERAL LOWER BACK

## 2017-08-01 ASSESSMENT — LOCATION SIMPLE DESCRIPTION DERM: LOCATION SIMPLE: LEFT LOWER BACK

## 2017-08-01 NOTE — PROCEDURE: MIPS QUALITY
Quality 110: Preventive Care And Screening: Influenza Immunization: Influenza Immunization previously received during influenza season
Quality 131: Pain Assessment And Follow-Up: Pain assessment documented as positive using a standardized tool AND a follow-up plan is documented
Quality 226: Preventive Care And Screening: Tobacco Use: Screening And Cessation Intervention: Patient screened for tobacco and never smoked
Quality 400a: One-Time Screening For Hepatitis C Virus (Hcv) For All Patients: Documentation of patient reason(s) for not receiving one-time screening for HCV infection
Quality 431: Preventive Care And Screening: Unhealthy Alcohol Use - Screening: Patient screened for unhealthy alcohol use using a single question and scores less than 2 times per year
Quality 130: Documentation Of Current Medications In The Medical Record: Current Medications Documented
Detail Level: Detailed

## 2017-08-01 NOTE — PROCEDURE: INTRALESIONAL KENALOG
Include Z78.9 (Other Specified Conditions Influencing Health Status) As An Associated Diagnosis?: No
Medical Necessity Clause: This procedure was medically necessary because the lesions that were treated were:
Administered By (Optional): ECD
Consent: The risks of atrophy were reviewed with the patient.
Concentration Of Solution Injected (Mg/Ml): 10.0
X Size Of Lesion In Cm (Optional): 0
Total Volume Injected (Ccs- Only Use Numbers And Decimals): 0.4
Kenalog Preparation: Kenalog
Detail Level: Detailed

## 2017-08-01 NOTE — PROCEDURE: INCISION AND DRAINAGE
Curette: No
Epidermal Sutures: 4-0 Ethilon
Dressing: dry sterile dressing
Method: 11 blade
Post-Care Instructions: I reviewed with the patient in detail post-care instructions. Patient should keep wound covered and call the office should any redness, pain, swelling or worsening occur.
Size Of Lesion In Cm (Optional But May Be Required For Some Insurances): 0
Wound Care: Bacitracin
Suture Text: The incision was partially closed with
Anesthesia Type: 1% lidocaine with epinephrine
Detail Level: Detailed
Consent was obtained and risks were reviewed including but not limited to delayed wound healing, infection, need for multiple I and D's, and pain.
Preparation Text: The area was prepped in the usual clean fashion.
Epidermal Closure: simple interrupted
Curette Text (Optional): After the contents were expressed a curette was used to partially remove the cyst wall.
Lesion Type: Abscess

## 2017-08-01 NOTE — HPI: EVALUATION OF SKIN LESION(S)
How Severe Are Your Spot(S)?: severe
Have Your Spot(S) Been Treated In The Past?: has been treated
Hpi Title: Evaluation of a Skin Lesion
When Was It Treated?: Past few days with Hibiclens and BPO Wash

## 2017-08-01 NOTE — PROCEDURE: TREATMENT REGIMEN
Detail Level: Zone
Initiate Treatment: Mupirocin apply twice daily to affected areas, Doxycycline 200mg

## 2017-08-11 ENCOUNTER — APPOINTMENT (OUTPATIENT)
Dept: URBAN - METROPOLITAN AREA CLINIC 211 | Age: 52
Setting detail: DERMATOLOGY
End: 2017-08-11

## 2017-08-11 DIAGNOSIS — Z41.9 ENCOUNTER FOR PROCEDURE FOR PURPOSES OTHER THAN REMEDYING HEALTH STATE, UNSPECIFIED: ICD-10-CM

## 2017-08-11 DIAGNOSIS — L72.8 OTHER FOLLICULAR CYSTS OF THE SKIN AND SUBCUTANEOUS TISSUE: ICD-10-CM

## 2017-08-11 DIAGNOSIS — L70.0 ACNE VULGARIS: ICD-10-CM

## 2017-08-11 PROBLEM — L85.3 XEROSIS CUTIS: Status: ACTIVE | Noted: 2017-08-11

## 2017-08-11 PROCEDURE — OTHER INTRALESIONAL KENALOG: OTHER

## 2017-08-11 PROCEDURE — OTHER OTHER (COSMETIC): OTHER

## 2017-08-11 PROCEDURE — OTHER ACNE SURGERY: OTHER

## 2017-08-11 PROCEDURE — 10040 ACNE SURGERY: CPT | Mod: 79

## 2017-08-11 PROCEDURE — 11900 INJECT SKIN LESIONS </W 7: CPT

## 2017-08-11 PROCEDURE — OTHER MIPS QUALITY: OTHER

## 2017-08-11 ASSESSMENT — LOCATION DETAILED DESCRIPTION DERM
LOCATION DETAILED: LEFT MEDIAL SUPERIOR CHEST
LOCATION DETAILED: RIGHT ANTERIOR PROXIMAL UPPER ARM

## 2017-08-11 ASSESSMENT — LOCATION SIMPLE DESCRIPTION DERM
LOCATION SIMPLE: RIGHT UPPER ARM
LOCATION SIMPLE: CHEST

## 2017-08-11 ASSESSMENT — LOCATION ZONE DERM
LOCATION ZONE: TRUNK
LOCATION ZONE: ARM

## 2017-08-11 NOTE — PROCEDURE: INTRALESIONAL KENALOG
Total Volume Injected (Ccs- Only Use Numbers And Decimals): 0.05
Concentration Of Solution Injected (Mg/Ml): 3.0
Kenalog Preparation: Kenalog
X Size Of Lesion In Cm (Optional): 0
Medical Necessity Clause: This procedure was medically necessary because the lesions that were treated were:
Detail Level: Simple
Consent: The risks of atrophy were reviewed with the patient.
Administered By (Optional): Heidi
Include Z78.9 (Other Specified Conditions Influencing Health Status) As An Associated Diagnosis?: No

## 2017-08-11 NOTE — PROCEDURE: OTHER (COSMETIC)
Detail Level: Zone
Other (Free Text): Dermaplaning\\n\\nTreatment #\\nTreatment Site:\\nTherapy is applied per protocol utilizing a 10 blade and scalpel with a gentle stroking motion/ 45 degree angle. Hydrotant marine masque applied with massage and occluded under a warm king towel for approx 2 mins.\\nPost procedure products:\\nNotes:\\n\\n\\Kim indications, treatment expectations (including management of any possible irritations), protocols, risks and benefits, pre/post care are reviewed. Details of these can be found on the appropriate attached informed consent documentation. Patient understands that multiple treatments may be necessary for optimum results and that ongoing maintenance with at-home products and additional ofice visits or treatments may be needed to enhance and extend the desired results.\\nStandard protocol was done. Following treatment, the mild erythema was observed. Patient tolerated the procedure well without immediate complication. Post care was reviewed and a follow up appointment was recommended.\\nRTC:

## 2017-08-11 NOTE — PROCEDURE: MIPS QUALITY
Detail Level: Detailed
Quality 110: Preventive Care And Screening: Influenza Immunization: Influenza Immunization previously received during influenza season
Quality 400a: One-Time Screening For Hepatitis C Virus (Hcv) For All Patients: Documentation of patient reason(s) for not receiving one-time screening for HCV infection
Quality 226: Preventive Care And Screening: Tobacco Use: Screening And Cessation Intervention: Patient screened for tobacco and never smoked
Quality 431: Preventive Care And Screening: Unhealthy Alcohol Use - Screening: Patient screened for unhealthy alcohol use using a single question and scores less than 2 times per year
Quality 131: Pain Assessment And Follow-Up: Pain assessment documented as positive using a standardized tool AND a follow-up plan is documented
Quality 130: Documentation Of Current Medications In The Medical Record: Current Medications Documented

## 2017-08-11 NOTE — PROCEDURE: ACNE SURGERY
Acne Type: Comedonal Lesions
Prep Text (Optional): Prior to removal the treatment areas were prepped in the usual fashion.\\nAcne Surgery with Shahid Acleara\\nIndication: improvement of open and closed comedones, papules, pustules, acne cysts\\nTreatment #\\nSite(s):\\nenergy:\\nvac level:\\npulse mode:\\npulse width/ms:\\nTotal Passes:\\n\\nAcne surgery with Lancet and Comedone Extractor:   Yes\\nIntralesional Injections with Kenalog preparation:      No                                          \\nConcentration: N/A\\nTotal Volume: N/A\\nProducts Applied: Pore Therapy and Cetaphil Oil Control Moisturizer w/SPF30\\n\\n\\Kim indications, treatment expectations (including management of any possible irritations), protocols, risks and benefits, pre/post care are reviewed. Details of these can be found on the appropriate attached informed consent documentation. Patient understands that multiple treatments may be necessary for optimum results and that ongoing maintenance with at-home products and additional office visits or treatments may be needed to enhance and extend the desired results.\\nStandard protocol was done. Reviewed with patient that ILS injections as well as manual extractions may be recommended for optimum results. These will be considered prn.  Following treatment, the expected mild erythema was observed. Patient tolerated the procedure well without immediate bruising or complication. Post care was reviewed.\\nRTC:
Detail Level: Zone
Post-Care Instructions: I reviewed with the patient in detail post-care instructions. Patient is to keep the treatment areaas dry overnight, and then apply bacitracin twice daily until healed. Patient may apply hydrogen peroxide soaks to remove any crusting.
Render Number Of Lesions Treated: no
Consent was obtained and risks were reviewed including but not limited to scarring, infection, bleeding, scabbing, incomplete removal, and allergy to anesthesia.
Extraction Method: lancet and extractor

## 2017-08-21 ENCOUNTER — RX ONLY (RX ONLY)
Age: 52
End: 2017-08-21

## 2017-08-21 RX ORDER — MUPIROCIN 20 MG/G
OINTMENT TOPICAL
Qty: 1 | Refills: 0 | Status: ERX | COMMUNITY
Start: 2017-08-21

## 2017-08-21 RX ORDER — DOXYCYCLINE HYCLATE 100 MG/1
CAPSULE, GELATIN COATED ORAL
Qty: 14 | Refills: 0 | Status: ERX

## 2017-08-28 ENCOUNTER — APPOINTMENT (OUTPATIENT)
Dept: URBAN - METROPOLITAN AREA CLINIC 211 | Age: 52
Setting detail: DERMATOLOGY
End: 2017-08-28

## 2017-08-28 DIAGNOSIS — L0391 CELLULITIS AND ABSCESS OF UNSPECIFIED SITES: ICD-10-CM

## 2017-08-28 DIAGNOSIS — L0390 CELLULITIS AND ABSCESS OF UNSPECIFIED SITES: ICD-10-CM

## 2017-08-28 DIAGNOSIS — Z41.9 ENCOUNTER FOR PROCEDURE FOR PURPOSES OTHER THAN REMEDYING HEALTH STATE, UNSPECIFIED: ICD-10-CM

## 2017-08-28 DIAGNOSIS — L70.0 ACNE VULGARIS: ICD-10-CM

## 2017-08-28 PROBLEM — L02.212 CUTANEOUS ABSCESS OF BACK [ANY PART, EXCEPT BUTTOCK]: Status: ACTIVE | Noted: 2017-08-28

## 2017-08-28 PROCEDURE — OTHER OTHER (COSMETIC): OTHER

## 2017-08-28 PROCEDURE — 99213 OFFICE O/P EST LOW 20 MIN: CPT

## 2017-08-28 PROCEDURE — OTHER COUNSELING: OTHER

## 2017-08-28 PROCEDURE — 10040 ACNE SURGERY: CPT

## 2017-08-28 PROCEDURE — OTHER TREATMENT REGIMEN: OTHER

## 2017-08-28 PROCEDURE — OTHER ACNE SURGERY: OTHER

## 2017-08-28 PROCEDURE — OTHER MIPS QUALITY: OTHER

## 2017-08-28 PROCEDURE — OTHER ORDER TESTS: OTHER

## 2017-08-28 ASSESSMENT — LOCATION SIMPLE DESCRIPTION DERM
LOCATION SIMPLE: CHEST
LOCATION SIMPLE: LEFT LOWER BACK

## 2017-08-28 ASSESSMENT — LOCATION DETAILED DESCRIPTION DERM
LOCATION DETAILED: LEFT INFERIOR LATERAL LOWER BACK
LOCATION DETAILED: LEFT MEDIAL SUPERIOR CHEST

## 2017-08-28 ASSESSMENT — LOCATION ZONE DERM
LOCATION ZONE: TRUNK
LOCATION ZONE: TRUNK

## 2017-08-28 NOTE — PROCEDURE: ACNE SURGERY
Render Number Of Lesions Treated: no
Post-Care Instructions: I reviewed with the patient in detail post-care instructions. Patient is to keep the treatment areaas dry overnight, and then apply bacitracin twice daily until healed. Patient may apply hydrogen peroxide soaks to remove any crusting.
Acne Type: Comedonal Lesions
Prep Text (Optional): Prior to removal the treatment areas were prepped in the usual fashion.\\nAcne Surgery with Shahid Acleara\\nIndication: improvement of open and closed comedones, papules, pustules, acne cysts\\nTreatment #\\nSite(s):\\nenergy:\\nvac level:\\npulse mode:\\npulse width/ms:\\nTotal Passes:\\n\\nAcne surgery with Lancet and Comedone Extractor:   Yes\\nIntralesional Injections with Kenalog preparation:      No                                          \\nConcentration: N/A\\nTotal Volume: N/A\\nProducts Applied: Pore Therapy and Cetaphil Oil Control Moisturizer w/SPF30\\n\\n\\Kim indications, treatment expectations (including management of any possible irritations), protocols, risks and benefits, pre/post care are reviewed. Details of these can be found on the appropriate attached informed consent documentation. Patient understands that multiple treatments may be necessary for optimum results and that ongoing maintenance with at-home products and additional office visits or treatments may be needed to enhance and extend the desired results.\\nStandard protocol was done. Reviewed with patient that ILS injections as well as manual extractions may be recommended for optimum results. These will be considered prn.  Following treatment, the expected mild erythema was observed. Patient tolerated the procedure well without immediate bruising or complication. Post care was reviewed.\\nRTC:
Extraction Method: lancet and extractor
Detail Level: Zone
Consent was obtained and risks were reviewed including but not limited to scarring, infection, bleeding, scabbing, incomplete removal, and allergy to anesthesia.

## 2017-08-28 NOTE — PROCEDURE: MIPS QUALITY
Quality 400a: One-Time Screening For Hepatitis C Virus (Hcv) For All Patients: Documentation of patient reason(s) for not receiving one-time screening for HCV infection
Quality 131: Pain Assessment And Follow-Up: Pain assessment documented as positive using a standardized tool AND a follow-up plan is documented
Quality 130: Documentation Of Current Medications In The Medical Record: Current Medications Documented
Quality 431: Preventive Care And Screening: Unhealthy Alcohol Use - Screening: Patient screened for unhealthy alcohol use using a single question and scores less than 2 times per year
Quality 226: Preventive Care And Screening: Tobacco Use: Screening And Cessation Intervention: Patient screened for tobacco and never smoked
Detail Level: Detailed
Quality 110: Preventive Care And Screening: Influenza Immunization: Influenza Immunization previously received during influenza season

## 2017-08-28 NOTE — PROCEDURE: OTHER (COSMETIC)
Detail Level: Zone
Other (Free Text): Silk Peel Dermalinfusion\\n\\nTreatment #\\nTreatment Site:\\nSelected Serum:\\nGrit Size:\\nPSI:\\nPost procedure products:\\nNotes:\\n\\n\\Kim indications, treatment expectations (including management of any possible irritations), protocols, risks and benefits, pre/post care are reviewed. Details of these can be found on the appropriate attached informed consent documentation. Patient understands that multiple treatments may be necessary for optimum results and that ongoing maintenance with at-home products and additional office visits or treatments may be needed to enhance and extend the desired results.\\nStandard protocol was done. The eyes were covered with specific eyeshields. Following treatment, the expected mild erythema was observed. Patient tolerated the procedure well without immediate complication. Post care was reviewed and a follow up appointment was recommended.\\n\\nAnswered all questions\\nRTC:

## 2017-08-28 NOTE — HPI: OTHER
Condition:: Acne
Please Describe Your Condition:: Pt presents today for Acleara # 19 and Silk Peel. Pain is 0/10. Med hx and allergies reviewed with no changes. Pt has stated that her left side cyst is not healing well. Pt has not been screened for HCV, recommended a visit to PCP for further details.

## 2017-08-28 NOTE — PROCEDURE: MIPS QUALITY
Quality 226: Preventive Care And Screening: Tobacco Use: Screening And Cessation Intervention: Patient screened for tobacco and never smoked
Quality 110: Preventive Care And Screening: Influenza Immunization: Influenza Immunization previously received during influenza season
Detail Level: Detailed
Quality 431: Preventive Care And Screening: Unhealthy Alcohol Use - Screening: Patient screened for unhealthy alcohol use using a single question and scores less than 2 times per year
Quality 130: Documentation Of Current Medications In The Medical Record: Current Medications Documented
Quality 131: Pain Assessment And Follow-Up: Pain assessment documented as positive using a standardized tool AND a follow-up plan is documented
Quality 400a: One-Time Screening For Hepatitis C Virus (Hcv) For All Patients: Documentation of patient reason(s) for not receiving one-time screening for HCV infection

## 2017-08-28 NOTE — PROCEDURE: TREATMENT REGIMEN
Plan: [place] the [place] for [place]. If culture comes back irregular patient can be contact via email at [email or web address] Plan: Patient will be [place] the states for ten days. If culture comes back irregular patient can be contact via email at [email or web address]

## 2017-09-11 ENCOUNTER — APPOINTMENT (OUTPATIENT)
Dept: URBAN - METROPOLITAN AREA CLINIC 211 | Age: 52
Setting detail: DERMATOLOGY
End: 2017-09-11

## 2017-09-11 DIAGNOSIS — Z41.9 ENCOUNTER FOR PROCEDURE FOR PURPOSES OTHER THAN REMEDYING HEALTH STATE, UNSPECIFIED: ICD-10-CM

## 2017-09-11 PROBLEM — L85.3 XEROSIS CUTIS: Status: ACTIVE | Noted: 2017-09-11

## 2017-09-11 PROBLEM — L70.0 ACNE VULGARIS: Status: ACTIVE | Noted: 2017-09-11

## 2017-09-11 PROCEDURE — OTHER OTHER (COSMETIC): OTHER

## 2017-09-11 PROCEDURE — OTHER MIPS QUALITY: OTHER

## 2017-09-11 NOTE — PROCEDURE: OTHER (COSMETIC)
Other (Free Text): IPL utilizing\\nIndication: improvement of pigmentation, reduction of vascular lesions and hair\\nTreatment #\\nSite(s):\\nSettings:\\nMelanin Index=\\n@  ms value =   j -  j\\nApplied   ms @   j x 1 pass\\n\\n\\Kim indications, treatment expectations (including management of any possible irritations), protocols, risks and benefits, pre/post care are reviewed. Details of these can be found on the appropriate attached informed consent documentation. Patient understands that multiple treaments may be necessary for optimum results and that ongoing maintenance with at-home products and additional office visits or treatments may be needed to enhance and extend the desired results.\\n\\nStandard protocol was done. The eyes were covered with IPL specific eyeshields. Following treatment, the expected mild erythema and edema was observed. Post cooling with chilled roller was done and a moisturizing sunblock was applied. Patient tolerated the procedure well without immediate complication. Post care was reviewed and a follow up appointment was recommended.

## 2017-09-11 NOTE — PROCEDURE: MIPS QUALITY
Quality 431: Preventive Care And Screening: Unhealthy Alcohol Use - Screening: Patient screened for unhealthy alcohol use using a single question and scores less than 2 times per year
Quality 226: Preventive Care And Screening: Tobacco Use: Screening And Cessation Intervention: Patient screened for tobacco and never smoked
Quality 400a: One-Time Screening For Hepatitis C Virus (Hcv) For All Patients: Documentation of patient reason(s) for not receiving one-time screening for HCV infection
Quality 130: Documentation Of Current Medications In The Medical Record: Current Medications Documented
Quality 131: Pain Assessment And Follow-Up: Pain assessment documented as positive using a standardized tool AND a follow-up plan is documented
Detail Level: Detailed
Quality 110: Preventive Care And Screening: Influenza Immunization: Influenza Immunization previously received during influenza season

## 2017-09-11 NOTE — HPI: OTHER
Condition:: Cosmetic
Please Describe Your Condition:: Pt presents today for SilkPeel # 5 and LHR UA # 6. Pain is 0/10. Med hx and allergies reviewed with no changes. Pt has not been screened for HCVC, recommended a visit to PCP for further details.

## 2017-09-11 NOTE — PROCEDURE: OTHER (COSMETIC)
Other (Free Text): Silk Peel Dermalinfusion\\n\\nTreatment #\\nTreatment Site:\\nSelected Serum:\\nGrit Size:\\nPSI:\\nPost procedure products:\\nNotes:\\n\\n\\iKm indications, treatment expectations (including management of any possible irritations), protocols, risks and benefits, pre/post care are reviewed. Details of these can be found on the appropriate attached informed consent documentation. Patient understands that multiple treatments may be necessary for optimum results and that ongoing maintenance with at-home products and additional office visits or treatments may be needed to enhance and extend the desired results.\\nStandard protocol was done. The eyes were covered with specific eyeshields. Following treatment, the expected mild erythema was observed. Patient tolerated the procedure well without immediate complication. Post care was reviewed and a follow up appointment was recommended.\\n\\nAnswered all questions\\nRTC: Other (Free Text): Silk Peel Dermalinfusion\\n\\nTreatment #\\nTreatment Site:\\nSelected Serum:\\nGrit Size:\\nPSI:\\nPost procedure products:\\nNotes:\\n\\n\\Kim indications, treatment expectations (including management of any possible irritations), protocols, risks and benefits, pre/post care are reviewed. Details of these can be found on the appropriate attached informed consent documentation. Patient understands that multiple treatments may be necessary for optimum results and that ongoing maintenance with at-home products and additional office visits or treatments may be needed to enhance and extend the desired results.\\nStandard protocol was done. The eyes were covered with specific eyeshields. Following treatment, the expected mild erythema was observed. Patient tolerated the procedure well without immediate complication. Post care was reviewed and a follow up appointment was recommended.\\n\\nAnswered all questions\\nRTC:

## 2017-11-14 NOTE — HPI: OTHER
----- Message from Gregorio Aburto MD sent at 11/13/2017  9:32 PM CST -----  Gluc ok  Lipid ok  lft high  mcv high  Add b12, folate  Any recent etoh use??   If no etoh then hold zocor x 10 days and bonny lft 1 week
Condition:: Cosmetic
Please Describe Your Condition:: PT presents for Alethea Ramos #7\\nReviewed with PT if they feel they are at risk for HVC to selin PCP screening.

## 2018-03-30 ENCOUNTER — APPOINTMENT (OUTPATIENT)
Dept: URBAN - METROPOLITAN AREA CLINIC 211 | Age: 53
Setting detail: DERMATOLOGY
End: 2018-04-01

## 2018-03-30 DIAGNOSIS — L70.0 ACNE VULGARIS: ICD-10-CM

## 2018-03-30 DIAGNOSIS — L72.8 OTHER FOLLICULAR CYSTS OF THE SKIN AND SUBCUTANEOUS TISSUE: ICD-10-CM

## 2018-03-30 PROBLEM — L57.0 ACTINIC KERATOSIS: Status: ACTIVE | Noted: 2018-03-30

## 2018-03-30 PROCEDURE — 10040 ACNE SURGERY: CPT

## 2018-03-30 PROCEDURE — 11900 INJECT SKIN LESIONS </W 7: CPT

## 2018-03-30 PROCEDURE — OTHER ACNE SURGERY: OTHER

## 2018-03-30 PROCEDURE — OTHER INTRALESIONAL KENALOG: OTHER

## 2018-03-30 ASSESSMENT — LOCATION SIMPLE DESCRIPTION DERM
LOCATION SIMPLE: CHEST
LOCATION SIMPLE: RIGHT BACK

## 2018-03-30 ASSESSMENT — LOCATION DETAILED DESCRIPTION DERM
LOCATION DETAILED: LEFT MEDIAL SUPERIOR CHEST
LOCATION DETAILED: RIGHT SUPERIOR LATERAL UPPER BACK

## 2018-03-30 ASSESSMENT — LOCATION ZONE DERM: LOCATION ZONE: TRUNK

## 2018-03-30 NOTE — PROCEDURE: ACNE SURGERY
Post-Care Instructions: I reviewed with the patient in detail post-care instructions. Patient is to keep the treatment areaas dry overnight, and then apply bacitracin twice daily until healed. Patient may apply hydrogen peroxide soaks to remove any crusting.
Prep Text (Optional): Prior to removal the treatment areas were prepped in the usual fashion.\\nAcne Surgery with Shahid Acleara\\nIndication: improvement of open and closed comedones, papules, pustules, acne cysts\\nTreatment #\\nSite(s):\\nenergy:\\nvac level:\\npulse mode:\\npulse width/ms:\\nTotal Passes:\\n\\nAcne surgery with Lancet and Comedone Extractor:   Yes\\nIntralesional Injections with Kenalog preparation:      No                                          \\nConcentration: N/A\\nTotal Volume: N/A\\nProducts Applied: Pore Therapy and Cetaphil Oil Control Moisturizer w/SPF30\\n\\n\\Kim indications, treatment expectations (including management of any possible irritations), protocols, risks and benefits, pre/post care are reviewed. Details of these can be found on the appropriate attached informed consent documentation. Patient understands that multiple treatments may be necessary for optimum results and that ongoing maintenance with at-home products and additional office visits or treatments may be needed to enhance and extend the desired results.\\nStandard protocol was done. Reviewed with patient that ILS injections as well as manual extractions may be recommended for optimum results. These will be considered prn.  Following treatment, the expected mild erythema was observed. Patient tolerated the procedure well without immediate bruising or complication. Post care was reviewed.\\nRTC:
Detail Level: Zone
Consent was obtained and risks were reviewed including but not limited to scarring, infection, bleeding, scabbing, incomplete removal, and allergy to anesthesia.
Extraction Method: lancet and extractor
Render Number Of Lesions Treated: no
Acne Type: Comedonal Lesions

## 2018-03-30 NOTE — PROCEDURE: INTRALESIONAL KENALOG
X Size Of Lesion In Cm (Optional): 0
Include Z78.9 (Other Specified Conditions Influencing Health Status) As An Associated Diagnosis?: No
Concentration Of Solution Injected (Mg/Ml): 3.0
Medical Necessity Clause: This procedure was medically necessary because the lesions that were treated were:
Administered By (Optional): Heidi
Total Volume Injected (Ccs- Only Use Numbers And Decimals): 0.7
Detail Level: Simple
Kenalog Preparation: Kenalog
Consent: The risks of atrophy were reviewed with the patient.

## 2018-07-16 ENCOUNTER — APPOINTMENT (OUTPATIENT)
Dept: URBAN - METROPOLITAN AREA CLINIC 211 | Age: 53
Setting detail: DERMATOLOGY
End: 2018-07-16

## 2018-07-16 DIAGNOSIS — L70.0 ACNE VULGARIS: ICD-10-CM

## 2018-07-16 PROCEDURE — OTHER ACNE SURGERY: OTHER

## 2018-07-16 PROCEDURE — 10040 ACNE SURGERY: CPT

## 2018-07-16 PROCEDURE — OTHER MIPS QUALITY: OTHER

## 2018-07-16 ASSESSMENT — LOCATION ZONE DERM: LOCATION ZONE: TRUNK

## 2018-07-16 ASSESSMENT — LOCATION SIMPLE DESCRIPTION DERM: LOCATION SIMPLE: CHEST

## 2018-07-16 ASSESSMENT — LOCATION DETAILED DESCRIPTION DERM: LOCATION DETAILED: LEFT MEDIAL SUPERIOR CHEST

## 2018-07-16 NOTE — PROCEDURE: MIPS QUALITY
Quality 226: Preventive Care And Screening: Tobacco Use: Screening And Cessation Intervention: Patient screened for tobacco and never smoked
Quality 110: Preventive Care And Screening: Influenza Immunization: Influenza Immunization previously received during influenza season
Detail Level: Detailed
Quality 431: Preventive Care And Screening: Unhealthy Alcohol Use - Screening: Patient screened for unhealthy alcohol use using a single question and scores less than 2 times per year
Quality 130: Documentation Of Current Medications In The Medical Record: Current Medications Documented

## 2018-07-16 NOTE — PROCEDURE: ACNE SURGERY
Render Post-Care Instructions In Note?: no
Post-Care Instructions: I reviewed with the patient in detail post-care instructions. Patient is to keep the treatment areaas dry overnight, and then apply bacitracin twice daily until healed. Patient may apply hydrogen peroxide soaks to remove any crusting.
Detail Level: Zone
Consent was obtained and risks were reviewed including but not limited to scarring, infection, bleeding, scabbing, incomplete removal, and allergy to anesthesia.
Extraction Method: lancet and extractor
Prep Text (Optional): Prior to removal the treatment areas were prepped in the usual fashion.\\nAcne Surgery with Shahid Acleara\\nIndication: improvement of open and closed comedones, papules, pustules, acne cysts\\nTreatment #\\nSite(s):\\nenergy:\\nvac level:\\npulse mode:\\npulse width/ms:\\nTotal Passes:\\n\\nAcne surgery with Lancet and Comedone Extractor:   Yes\\nIntralesional Injections with Kenalog preparation:      No                                          \\nConcentration: N/A\\nTotal Volume: N/A\\nProducts Applied: Pore Therapy and Cetaphil Oil Control Moisturizer w/SPF30\\n\\n\\Kim indications, treatment expectations (including management of any possible irritations), protocols, risks and benefits, pre/post care are reviewed. Details of these can be found on the appropriate attached informed consent documentation. Patient understands that multiple treatments may be necessary for optimum results and that ongoing maintenance with at-home products and additional office visits or treatments may be needed to enhance and extend the desired results.\\nStandard protocol was done. Reviewed with patient that ILS injections as well as manual extractions may be recommended for optimum results. These will be considered prn.  Following treatment, the expected mild erythema was observed. Patient tolerated the procedure well without immediate bruising or complication. Post care was reviewed.\\nRTC:
Acne Type: Comedonal Lesions

## 2018-07-16 NOTE — HPI: OTHER
Condition:: Acleara
Please Describe Your Condition:: Pt presents for Acleara tx # 18 for axillary, trunk and under breast.  Medications, allergies and medical hx were reviewed.

## 2018-11-30 ENCOUNTER — APPOINTMENT (OUTPATIENT)
Dept: URBAN - METROPOLITAN AREA CLINIC 211 | Age: 53
Setting detail: DERMATOLOGY
End: 2018-12-03

## 2018-11-30 ENCOUNTER — APPOINTMENT (OUTPATIENT)
Dept: URBAN - METROPOLITAN AREA CLINIC 211 | Age: 53
Setting detail: DERMATOLOGY
End: 2018-12-14

## 2018-11-30 DIAGNOSIS — L90.5 SCAR CONDITIONS AND FIBROSIS OF SKIN: ICD-10-CM

## 2018-11-30 DIAGNOSIS — L98.8 OTHER SPECIFIED DISORDERS OF THE SKIN AND SUBCUTANEOUS TISSUE: ICD-10-CM

## 2018-11-30 DIAGNOSIS — L72.8 OTHER FOLLICULAR CYSTS OF THE SKIN AND SUBCUTANEOUS TISSUE: ICD-10-CM

## 2018-11-30 DIAGNOSIS — L70.0 ACNE VULGARIS: ICD-10-CM

## 2018-11-30 PROBLEM — L85.3 XEROSIS CUTIS: Status: ACTIVE | Noted: 2018-11-30

## 2018-11-30 PROCEDURE — OTHER COUNSELING: OTHER

## 2018-11-30 PROCEDURE — 11900 INJECT SKIN LESIONS </W 7: CPT

## 2018-11-30 PROCEDURE — OTHER INTRALESIONAL KENALOG: OTHER

## 2018-11-30 PROCEDURE — OTHER BOTOX: OTHER

## 2018-11-30 PROCEDURE — OTHER MIPS QUALITY: OTHER

## 2018-11-30 PROCEDURE — 99212 OFFICE O/P EST SF 10 MIN: CPT

## 2018-11-30 PROCEDURE — OTHER TREATMENT REGIMEN: OTHER

## 2018-11-30 PROCEDURE — OTHER DEFER: OTHER

## 2018-11-30 PROCEDURE — OTHER COSMETIC CONSULTATION: FILLERS: OTHER

## 2018-11-30 PROCEDURE — 10040 ACNE SURGERY: CPT

## 2018-11-30 PROCEDURE — OTHER ACNE SURGERY: OTHER

## 2018-11-30 ASSESSMENT — LOCATION DETAILED DESCRIPTION DERM
LOCATION DETAILED: INFERIOR MID FOREHEAD
LOCATION DETAILED: RIGHT MEDIAL BUCCAL CHEEK
LOCATION DETAILED: SUPERIOR MID FOREHEAD
LOCATION DETAILED: LEFT MEDIAL BREAST 6-7:00 REGION
LOCATION DETAILED: LEFT MEDIAL SUPERIOR CHEST
LOCATION DETAILED: RIGHT LATERAL ABDOMEN
LOCATION DETAILED: RIGHT LATERAL BREAST 6-7:00 REGION

## 2018-11-30 ASSESSMENT — LOCATION SIMPLE DESCRIPTION DERM
LOCATION SIMPLE: LEFT BREAST
LOCATION SIMPLE: RIGHT CHEEK
LOCATION SIMPLE: ABDOMEN
LOCATION SIMPLE: SUPERIOR FOREHEAD
LOCATION SIMPLE: CHEST
LOCATION SIMPLE: INFERIOR FOREHEAD
LOCATION SIMPLE: RIGHT BREAST

## 2018-11-30 ASSESSMENT — LOCATION ZONE DERM
LOCATION ZONE: FACE
LOCATION ZONE: TRUNK

## 2018-11-30 NOTE — PROCEDURE: DEFER
Other Procedure: TCA CROSS
Scheduling Instructions (Optional): Cosmetic planner given to patient. Patient to schedule procedures with Dr. Orlando
Other Procedure: Filler
Scheduling Instructions (Optional): Cosmetic planner given to patient. Patient to schedule with Dr. Orlando
Detail Level: Simple
Detail Level: Zone

## 2018-11-30 NOTE — PROCEDURE: TREATMENT REGIMEN
Plan: Discussed Voluma (x2) to bilateral cheeks along with HA (x1) to lip. Patient to numb 30minutes prior to filler appointment.  \\n\\n* See attachments for cosmetic planner
Plan: Recommended TCA CROSS to right lower mouth area
Detail Level: Zone

## 2018-11-30 NOTE — HPI: COSMETIC (BOTOX)
Have You Had Botox Before?: has had botox
Additional History: Pain 0/10.
When Was Your Last Botox Treatment?: 02/13/2017

## 2018-11-30 NOTE — PROCEDURE: ACNE SURGERY
Render Post-Care Instructions In Note?: no
Detail Level: Zone
Prep Text (Optional): Prior to removal the treatment areas were prepped in the usual fashion.\\nAcne Surgery with Shahid Acleara\\nPE Indication: improvement of open and closed comedones, papules, pustules, acne cysts\\nTreatment #\\nSite(s):\\nenergy:\\nvac level:\\npulse mode:\\npulse width/ms:\\nTotal Passes:\\n\\nAcne surgery with Lancet and Comedone Extractor:   Yes\\nIntralesional Injections with Kenalog preparation:      No                                          \\nConcentration: N/A\\nTotal Volume: N/A\\nProducts Applied: Pore Therapy and Cetaphil Oil Control Moisturizer w/SPF30\\n\\n\\Kim indications, treatment expectations (including management of any possible irritations), protocols, risks and benefits, pre/post care are reviewed. Details of these can be found on the appropriate attached informed consent documentation. Patient understands that multiple treatments may be necessary for optimum results and that ongoing maintenance with at-home products and additional office visits or treatments may be needed to enhance and extend the desired results.\\nStandard protocol was done. Reviewed with patient that ILS injections as well as manual extractions may be recommended for optimum results. These will be considered prn.  Following treatment, the expected mild erythema was observed. Patient tolerated the procedure well without immediate bruising or complication. Post care was reviewed.\\nRTC:
Consent was obtained and risks were reviewed including but not limited to scarring, infection, bleeding, scabbing, incomplete removal, and allergy to anesthesia.
Post-Care Instructions: I reviewed with the patient in detail post-care instructions. Patient is to keep the treatment areaas dry overnight, and then apply bacitracin twice daily until healed. Patient may apply hydrogen peroxide soaks to remove any crusting.
Extraction Method: lancet and extractor
Acne Type: Comedonal Lesions

## 2018-11-30 NOTE — PROCEDURE: MIPS QUALITY
Quality 131: Pain Assessment And Follow-Up: Pain assessment using a standardized tool is documented as negative, no follow-up plan required
Detail Level: Detailed
Quality 110: Preventive Care And Screening: Influenza Immunization: Influenza Immunization Administered during Influenza season
Quality 226: Preventive Care And Screening: Tobacco Use: Screening And Cessation Intervention: Patient screened for tobacco use and is an ex/non-smoker
Quality 431: Preventive Care And Screening: Unhealthy Alcohol Use - Screening: Patient screened for unhealthy alcohol use using a single question and scores less than 2 times per year
Quality 130: Documentation Of Current Medications In The Medical Record: Current Medications Documented

## 2018-11-30 NOTE — PROCEDURE: MIPS QUALITY
Quality 110: Preventive Care And Screening: Influenza Immunization: Influenza Immunization not Administered for Documented Reasons.
Quality 226: Preventive Care And Screening: Tobacco Use: Screening And Cessation Intervention: Patient screened for tobacco and never smoked
Detail Level: Detailed
Quality 431: Preventive Care And Screening: Unhealthy Alcohol Use - Screening: Patient screened for unhealthy alcohol use using a single question and scores less than 2 times per year
Quality 130: Documentation Of Current Medications In The Medical Record: Current Medications Documented
Quality 131: Pain Assessment And Follow-Up: Pain assessment using a standardized tool is documented as negative, no follow-up plan required

## 2018-11-30 NOTE — PROCEDURE: INTRALESIONAL KENALOG
Administered By (Optional): Heidi
Detail Level: Simple
Concentration Of Solution Injected (Mg/Ml): 3.0
Consent: The risks of atrophy were reviewed with the patient.
X Size Of Lesion In Cm (Optional): 0
Total Volume Injected (Ccs- Only Use Numbers And Decimals): 0.3
Include Z78.9 (Other Specified Conditions Influencing Health Status) As An Associated Diagnosis?: No
Kenalog Preparation: Kenalog
Medical Necessity Clause: This procedure was medically necessary because the lesions that were treated were:

## 2018-12-04 ENCOUNTER — APPOINTMENT (OUTPATIENT)
Dept: URBAN - METROPOLITAN AREA CLINIC 220 | Age: 53
Setting detail: DERMATOLOGY
End: 2018-12-06

## 2018-12-04 DIAGNOSIS — L98.8 OTHER SPECIFIED DISORDERS OF THE SKIN AND SUBCUTANEOUS TISSUE: ICD-10-CM

## 2018-12-04 PROCEDURE — OTHER MIPS QUALITY: OTHER

## 2018-12-04 PROCEDURE — OTHER FILLERS: OTHER

## 2018-12-04 PROCEDURE — OTHER DEFER: OTHER

## 2018-12-04 PROCEDURE — OTHER COUNSELING: OTHER

## 2018-12-04 ASSESSMENT — LOCATION DETAILED DESCRIPTION DERM
LOCATION DETAILED: RIGHT SUPERIOR VERMILION LIP
LOCATION DETAILED: RIGHT INFERIOR VERMILION LIP
LOCATION DETAILED: INFERIOR MID FOREHEAD

## 2018-12-04 ASSESSMENT — LOCATION SIMPLE DESCRIPTION DERM
LOCATION SIMPLE: INFERIOR FOREHEAD
LOCATION SIMPLE: RIGHT LIP

## 2018-12-04 ASSESSMENT — LOCATION ZONE DERM
LOCATION ZONE: FACE
LOCATION ZONE: LIP

## 2018-12-04 NOTE — PROCEDURE: DEFER
Scheduling Instructions (Optional): Cosmetic planner in attachments. Patient to fill lips after discontinuing Aspirin for 10 days
Other Procedure: Filler
Detail Level: Simple

## 2018-12-04 NOTE — PROCEDURE: MIPS QUALITY
Quality 130: Documentation Of Current Medications In The Medical Record: Current Medications Documented
Quality 431: Preventive Care And Screening: Unhealthy Alcohol Use - Screening: Patient screened for unhealthy alcohol use using a single question and scores less than 2 times per year
Detail Level: Detailed
Quality 131: Pain Assessment And Follow-Up: Pain assessment using a standardized tool is documented as negative, no follow-up plan required
Quality 400a: One-Time Screening For Hepatitis C Virus (Hcv) For All Patients: Patient received one-time screening for HCV infection
Quality 110: Preventive Care And Screening: Influenza Immunization: Influenza Immunization not Administered for Documented Reasons.

## 2018-12-04 NOTE — HPI: OTHER
Condition:: Cosmetic
Please Describe Your Condition:: Pt presents for TCA 85% tx to select lesion on the right oral commissure.

## 2018-12-14 ENCOUNTER — APPOINTMENT (OUTPATIENT)
Dept: URBAN - METROPOLITAN AREA CLINIC 211 | Age: 53
Setting detail: DERMATOLOGY
End: 2018-12-17

## 2018-12-14 DIAGNOSIS — L98.8 OTHER SPECIFIED DISORDERS OF THE SKIN AND SUBCUTANEOUS TISSUE: ICD-10-CM

## 2018-12-14 DIAGNOSIS — L90.5 SCAR CONDITIONS AND FIBROSIS OF SKIN: ICD-10-CM

## 2018-12-14 PROBLEM — L70.0 ACNE VULGARIS: Status: ACTIVE | Noted: 2018-12-14

## 2018-12-14 PROCEDURE — OTHER COUNSELING: OTHER

## 2018-12-14 PROCEDURE — OTHER FILLERS: OTHER

## 2018-12-14 PROCEDURE — OTHER MIPS QUALITY: OTHER

## 2018-12-14 PROCEDURE — OTHER TCA CROSS: OTHER

## 2018-12-14 ASSESSMENT — LOCATION DETAILED DESCRIPTION DERM
LOCATION DETAILED: LEFT LOWER CUTANEOUS LIP
LOCATION DETAILED: RIGHT MEDIAL BUCCAL CHEEK
LOCATION DETAILED: LEFT ORAL COMMISSURE
LOCATION DETAILED: RIGHT INFERIOR MEDIAL MALAR CHEEK
LOCATION DETAILED: RIGHT LOWER CUTANEOUS LIP
LOCATION DETAILED: LEFT UPPER CUTANEOUS LIP
LOCATION DETAILED: LEFT INFERIOR MEDIAL MALAR CHEEK
LOCATION DETAILED: LEFT SUPERIOR VERMILION LIP

## 2018-12-14 ASSESSMENT — LOCATION SIMPLE DESCRIPTION DERM
LOCATION SIMPLE: RIGHT CHEEK
LOCATION SIMPLE: RIGHT LIP
LOCATION SIMPLE: LEFT CHEEK
LOCATION SIMPLE: LEFT LIP

## 2018-12-14 ASSESSMENT — LOCATION ZONE DERM
LOCATION ZONE: LIP
LOCATION ZONE: FACE

## 2018-12-14 NOTE — PROCEDURE: MIPS QUALITY
Quality 110: Preventive Care And Screening: Influenza Immunization: Influenza Immunization Administered during Influenza season
Quality 130: Documentation Of Current Medications In The Medical Record: Current Medications Documented
Quality 400a: One-Time Screening For Hepatitis C Virus (Hcv) For All Patients: Patient received one-time screening for HCV infection
Quality 431: Preventive Care And Screening: Unhealthy Alcohol Use - Screening: Patient screened for unhealthy alcohol use using a single question and scores less than 2 times per year
Detail Level: Detailed
Quality 131: Pain Assessment And Follow-Up: Pain assessment using a standardized tool is documented as negative, no follow-up plan required

## 2018-12-14 NOTE — PROCEDURE: TCA CROSS
Consent: Prior to the procedure, written consent was obtained and risks were reviewed, including but not limited to: redness, peeling, blistering, pigmentary change, scarring, infection, and pain. Patient is aware multiple treatments may be necessary to achieve the desired outcome.
Prep: After the procedure, the treatment areas were washed with soap and water, and a post-peel cream was applied. Sun protection and post-care instructions were reviewed with the patient.
Tca %: 85% TCA
Erythema: mild
Treatment Number: 1
Frost (0,1+,2+,3+,4+): 1+
Post-Care Instructions: I reviewed with the patient in detail post-care instructions. Patient should avoid sun exposure and wear sun protection.
Prep: The treated area was degreased with pre-peel cleanser, and vaseline was applied for protection of mucous membranes.
Detail Level: Detailed

## 2019-02-01 ENCOUNTER — APPOINTMENT (OUTPATIENT)
Dept: URBAN - METROPOLITAN AREA CLINIC 211 | Age: 54
Setting detail: DERMATOLOGY
End: 2019-02-04

## 2019-02-01 DIAGNOSIS — L72.8 OTHER FOLLICULAR CYSTS OF THE SKIN AND SUBCUTANEOUS TISSUE: ICD-10-CM

## 2019-02-01 DIAGNOSIS — Z41.9 ENCOUNTER FOR PROCEDURE FOR PURPOSES OTHER THAN REMEDYING HEALTH STATE, UNSPECIFIED: ICD-10-CM

## 2019-02-01 DIAGNOSIS — L70.0 ACNE VULGARIS: ICD-10-CM

## 2019-02-01 PROBLEM — L57.0 ACTINIC KERATOSIS: Status: ACTIVE | Noted: 2019-02-01

## 2019-02-01 PROBLEM — L85.3 XEROSIS CUTIS: Status: ACTIVE | Noted: 2019-02-01

## 2019-02-01 PROCEDURE — OTHER INTRALESIONAL KENALOG: OTHER

## 2019-02-01 PROCEDURE — OTHER OTHER (COSMETIC): OTHER

## 2019-02-01 PROCEDURE — OTHER ACNE SURGERY: OTHER

## 2019-02-01 PROCEDURE — 10040 ACNE SURGERY: CPT

## 2019-02-01 PROCEDURE — OTHER MIPS QUALITY: OTHER

## 2019-02-01 PROCEDURE — 11900 INJECT SKIN LESIONS </W 7: CPT

## 2019-02-01 ASSESSMENT — LOCATION SIMPLE DESCRIPTION DERM
LOCATION SIMPLE: ABDOMEN
LOCATION SIMPLE: RIGHT BREAST
LOCATION SIMPLE: CHEST

## 2019-02-01 ASSESSMENT — LOCATION DETAILED DESCRIPTION DERM
LOCATION DETAILED: LEFT MEDIAL SUPERIOR CHEST
LOCATION DETAILED: LEFT RIB CAGE
LOCATION DETAILED: RIGHT LATERAL BREAST 7-8:00 REGION

## 2019-02-01 ASSESSMENT — LOCATION ZONE DERM: LOCATION ZONE: TRUNK

## 2019-02-01 NOTE — PROCEDURE: ACNE SURGERY
Post-Care Instructions: I reviewed with the patient in detail post-care instructions. Patient is to keep the treatment areaas dry overnight, and then apply bacitracin twice daily until healed. Patient may apply hydrogen peroxide soaks to remove any crusting.
Acne Type: Comedonal Lesions
Render Post-Care Instructions In Note?: no
Detail Level: Zone
Prep Text (Optional): Prior to removal the treatment areas were prepped in the usual fashion.\\nAcne Surgery with Shahid Acleara\\nPE Indication: improvement of open and closed comedones, papules, pustules, acne cysts\\nTreatment #\\nSite(s):\\nenergy:\\nvac level:\\npulse mode:\\npulse width/ms:\\nTotal Passes:\\n\\nAcne surgery with Lancet and Comedone Extractor:   Yes\\nIntralesional Injections with Kenalog preparation:      No                                          \\nConcentration: N/A\\nTotal Volume: N/A\\nProducts Applied: Pore Therapy and Cetaphil Oil Control Moisturizer w/SPF30\\n\\n\\Kim indications, treatment expectations (including management of any possible irritations), protocols, risks and benefits, pre/post care are reviewed. Details of these can be found on the appropriate attached informed consent documentation. Patient understands that multiple treatments may be necessary for optimum results and that ongoing maintenance with at-home products and additional office visits or treatments may be needed to enhance and extend the desired results.\\nStandard protocol was done. Reviewed with patient that ILS injections as well as manual extractions may be recommended for optimum results. These will be considered prn.  Following treatment, the expected mild erythema was observed. Patient tolerated the procedure well without immediate bruising or complication. Post care was reviewed.\\nRTC:\\n
Consent was obtained and risks were reviewed including but not limited to scarring, infection, bleeding, scabbing, incomplete removal, and allergy to anesthesia.
Extraction Method: lancet and extractor

## 2019-02-01 NOTE — PROCEDURE: OTHER (COSMETIC)
Other (Free Text): Cosmetic \\nOther: Fire and Ice Facial \\nPE: \\nPrior to administering therapy, reviewed all but not limited to R & B, treatment expectations, protocol, and post care. \\n\\nTreatment #\\nTreatment Site:\\nSelected Serum:\\nGrit Size:\\nPSI:\\n\\nMasque #1: IS Intensive Resurfacing mask was then massaged on and occluded with a warm aromatic king towel, removed after two minutes.  \\nMasque #2: IS Rejuvenating mask was massaged on and occluded with a warm aromatic king towel, removed after two minutes.  \\n\\n Other Procedure Products:\\n\\n\\nNotes:\\nStandard protocol was applied. All indications, treatment expectations (including management of any possible irritations), protocols, risks and benefits, pre/post care are reviewed. Details of these can be found on the appropriate attached informed consent documentation. Patient understands that multiple treatments may be necessary for optimum results and that ongoing maintenance with at-home products and additional office visits or treatments may be needed to enhance and extend the desired results. Post care was reviewed. Answered all questions. \\nRTC:\\n\\n\\n
Detail Level: Zone

## 2019-02-01 NOTE — PROCEDURE: INTRALESIONAL KENALOG
Kenalog Preparation: Kenalog
Total Volume Injected (Ccs- Only Use Numbers And Decimals): 0.25
Concentration Of Solution Injected (Mg/Ml): 3.0
Medical Necessity Clause: This procedure was medically necessary because the lesions that were treated were:
X Size Of Lesion In Cm (Optional): 0
Detail Level: Simple
Include Z78.9 (Other Specified Conditions Influencing Health Status) As An Associated Diagnosis?: No
Consent: The risks of atrophy were reviewed with the patient.
Administered By (Optional): Heidi

## 2019-02-01 NOTE — PROCEDURE: MIPS QUALITY
Quality 400a: One-Time Screening For Hepatitis C Virus (Hcv) For All Patients: Patient received one-time screening for HCV infection
Quality 226: Preventive Care And Screening: Tobacco Use: Screening And Cessation Intervention: Patient screened for tobacco use and is an ex/non-smoker
Quality 110: Preventive Care And Screening: Influenza Immunization: Influenza immunization was not ordered or administered, reason not given
Quality 130: Documentation Of Current Medications In The Medical Record: Current Medications Documented
Quality 431: Preventive Care And Screening: Unhealthy Alcohol Use - Screening: Patient screened for unhealthy alcohol use using a single question and scores less than 2 times per year
Detail Level: Detailed

## 2019-02-01 NOTE — HPI: OTHER
Condition:: cosmetic consult
Please Describe Your Condition:: Pt presents for the TheraClear tx on her underarms and trunk.  Pt also presents for a facial.  Pt c/o oily skin and some dry patches.  Medications, allergies and medical hx were reviewed.

## 2019-03-08 ENCOUNTER — APPOINTMENT (OUTPATIENT)
Dept: URBAN - METROPOLITAN AREA CLINIC 211 | Age: 54
Setting detail: DERMATOLOGY
End: 2019-03-16

## 2019-03-08 DIAGNOSIS — L70.0 ACNE VULGARIS: ICD-10-CM

## 2019-03-08 DIAGNOSIS — Z41.9 ENCOUNTER FOR PROCEDURE FOR PURPOSES OTHER THAN REMEDYING HEALTH STATE, UNSPECIFIED: ICD-10-CM

## 2019-03-08 PROBLEM — L57.0 ACTINIC KERATOSIS: Status: ACTIVE | Noted: 2019-03-08

## 2019-03-08 PROCEDURE — 10040 ACNE SURGERY: CPT

## 2019-03-08 PROCEDURE — OTHER OTHER (COSMETIC): OTHER

## 2019-03-08 PROCEDURE — OTHER MIPS QUALITY: OTHER

## 2019-03-08 PROCEDURE — OTHER ACNE SURGERY: OTHER

## 2019-03-08 ASSESSMENT — LOCATION ZONE DERM: LOCATION ZONE: TRUNK

## 2019-03-08 ASSESSMENT — LOCATION SIMPLE DESCRIPTION DERM: LOCATION SIMPLE: CHEST

## 2019-03-08 ASSESSMENT — LOCATION DETAILED DESCRIPTION DERM: LOCATION DETAILED: LEFT MEDIAL SUPERIOR CHEST

## 2019-03-08 NOTE — HPI: OTHER
Condition:: Cosmetic and acne
Please Describe Your Condition:: comes in for a chief complaint of Cosmetic and acne. Pt presents for TheraClear for under the breast and axillaries and inframammary.  Pt also presents for fire and ice facial and TCA Cross for select pitted atrophic lesions around her mouth and chin.  Pt denies any specific concerns today.  Medications, allergies and medical hx were reviewed.

## 2019-03-08 NOTE — PROCEDURE: MIPS QUALITY
Quality 226: Preventive Care And Screening: Tobacco Use: Screening And Cessation Intervention: Patient screened for tobacco use and is an ex/non-smoker
Quality 110: Preventive Care And Screening: Influenza Immunization: Influenza immunization was not ordered or administered, reason not given
Quality 431: Preventive Care And Screening: Unhealthy Alcohol Use - Screening: Patient screened for unhealthy alcohol use using a single question and scores less than 2 times per year
Detail Level: Detailed
Quality 400a: One-Time Screening For Hepatitis C Virus (Hcv) For All Patients: Patient received one-time screening for HCV infection
Quality 130: Documentation Of Current Medications In The Medical Record: Current Medications Documented

## 2019-04-19 ENCOUNTER — APPOINTMENT (OUTPATIENT)
Dept: URBAN - METROPOLITAN AREA CLINIC 211 | Age: 54
Setting detail: DERMATOLOGY
End: 2019-04-25

## 2019-04-19 DIAGNOSIS — Z41.9 ENCOUNTER FOR PROCEDURE FOR PURPOSES OTHER THAN REMEDYING HEALTH STATE, UNSPECIFIED: ICD-10-CM

## 2019-04-19 DIAGNOSIS — L72.8 OTHER FOLLICULAR CYSTS OF THE SKIN AND SUBCUTANEOUS TISSUE: ICD-10-CM

## 2019-04-19 DIAGNOSIS — L70.0 ACNE VULGARIS: ICD-10-CM

## 2019-04-19 PROBLEM — L57.0 ACTINIC KERATOSIS: Status: ACTIVE | Noted: 2019-04-19

## 2019-04-19 PROBLEM — L85.3 XEROSIS CUTIS: Status: ACTIVE | Noted: 2019-04-19

## 2019-04-19 PROCEDURE — OTHER MIPS QUALITY: OTHER

## 2019-04-19 PROCEDURE — OTHER ACNE SURGERY: OTHER

## 2019-04-19 PROCEDURE — OTHER OTHER (COSMETIC): OTHER

## 2019-04-19 PROCEDURE — 11900 INJECT SKIN LESIONS </W 7: CPT

## 2019-04-19 PROCEDURE — 10040 ACNE SURGERY: CPT

## 2019-04-19 PROCEDURE — OTHER INTRALESIONAL KENALOG: OTHER

## 2019-04-19 ASSESSMENT — LOCATION DETAILED DESCRIPTION DERM
LOCATION DETAILED: LEFT MEDIAL BREAST 7-8:00 REGION
LOCATION DETAILED: LEFT MEDIAL SUPERIOR CHEST

## 2019-04-19 ASSESSMENT — LOCATION SIMPLE DESCRIPTION DERM
LOCATION SIMPLE: LEFT BREAST
LOCATION SIMPLE: CHEST

## 2019-04-19 ASSESSMENT — LOCATION ZONE DERM: LOCATION ZONE: TRUNK

## 2019-04-19 NOTE — HPI: OTHER
Condition:: Cosmetic
Please Describe Your Condition:: Patient presents for Acne Surgery tx #2 to breasts and bilateral axilla, and Fire and Ice Facial to face for skin maintenance and acne scars. Patient denies having any additional concerns. Medications, allergies, and medical hx were reviewed.

## 2019-04-19 NOTE — PROCEDURE: ACNE SURGERY
Acne Type: Comedonal Lesions
Extraction Method: lancet and extractor
Post-Care Instructions: I reviewed with the patient in detail post-care instructions. Patient is to keep the treatment areaas dry overnight, and then apply bacitracin twice daily until healed. Patient may apply hydrogen peroxide soaks to remove any crusting.
Prep Text (Optional): Prior to removal the treatment areas were prepped in the usual fashion.\\n\\nAcne Surgery with Shahid Acleara\\n\\nPE Indication: improvement of open and closed comedones, papules, pustules, acne cysts\\n\\nTreatment #2\\n\\nAnd Site(s): Bilateral axilla \\n\\nenergy: 6\\n\\nvac level: 1\\n\\npulse mode: DBL with 500 ms \\n\\npulse width/ms: short \\n\\nTotal Passes: 2\\n \\n\\nAcne surgery with Lancet and Comedone Extractor:   Yes\\nIntralesional Injections with Kenalog preparation:      No                                          \\nConcentration: N/A\\nTotal Volume: N/A\\nProducts Applied: \\n\\n\\Kim indications, treatment expectations (including management of any possible irritations), protocols, risks and benefits, pre/post care are reviewed. Details of these can be found on the appropriate attached informed consent documentation. Patient understands that multiple treatments may be necessary for optimum results and that ongoing maintenance with at-home products and additional office visits or treatments may be needed to enhance and extend the desired results.\\nStandard protocol was done. Reviewed with patient that ILS injections as well as manual extractions may be recommended for optimum results. These will be considered prn.  Following treatment, the expected mild erythema was observed. Patient tolerated the procedure well without immediate bruising or complication. Post care was reviewed.\\n\\nRTC: tbd
Consent was obtained and risks were reviewed including but not limited to scarring, infection, bleeding, scabbing, incomplete removal, and allergy to anesthesia.
Render Post-Care Instructions In Note?: no
Detail Level: Zone

## 2019-04-19 NOTE — PROCEDURE: OTHER (COSMETIC)
Detail Level: Zone
Other (Free Text): Cosmetic \\nOther: Fire and Ice Facial \\n\\nPE: Skin Maintenance and acne scarring \\n\\nPrior to administering therapy, reviewed all but not limited to R & B, treatment expectations, protocol, and post care. \\n\\nTreatment #1\\n\\nTreatment Site: Full Face\\n\\nPrior to tx skin was cleansed with Areli Gentle and Clarifying cleansers, followed by PCA Nutrient Toner. \\n\\nSelected Serum: Pore Clarifying \\n\\nGrit Size: 9-60\\n\\nPSI: 2.5 - 2.6 \\n\\nMasque #1: IS Intensive Resurfacing mask was then massaged on and occluded with a warm aromatic king towel, removed after two minutes.  \\n\\nMasque #2: IS Rejuvenating mask was massaged on and occluded with a warm aromatic king towel, removed after two minutes.  \\n\\n Other Procedure Products: ZO Brightenex, ZO Exfoliation Accelerator , ZO Smart Tone SPF, Youth Eye, and ZO Lip \\n\\n\\nNotes:\\n\\nStandard protocol was applied. All indications, treatment expectations (including management of any possible irritations), protocols, risks and benefits, pre/post care are reviewed. Details of these can be found on the appropriate attached informed consent documentation. Patient understands that multiple treatments may be necessary for optimum results and that ongoing maintenance with at-home products and additional office visits or treatments may be needed to enhance and extend the desired results. Post care was reviewed. Answered all questions. \\n\\nRTC: tbd

## 2019-07-03 ENCOUNTER — APPOINTMENT (OUTPATIENT)
Dept: URBAN - METROPOLITAN AREA CLINIC 211 | Age: 54
Setting detail: DERMATOLOGY
End: 2019-07-10

## 2019-07-03 DIAGNOSIS — L72.8 OTHER FOLLICULAR CYSTS OF THE SKIN AND SUBCUTANEOUS TISSUE: ICD-10-CM

## 2019-07-03 DIAGNOSIS — L70.0 ACNE VULGARIS: ICD-10-CM

## 2019-07-03 DIAGNOSIS — Z41.9 ENCOUNTER FOR PROCEDURE FOR PURPOSES OTHER THAN REMEDYING HEALTH STATE, UNSPECIFIED: ICD-10-CM

## 2019-07-03 PROBLEM — L57.0 ACTINIC KERATOSIS: Status: ACTIVE | Noted: 2019-07-03

## 2019-07-03 PROCEDURE — OTHER MIPS QUALITY: OTHER

## 2019-07-03 PROCEDURE — 10040 ACNE SURGERY: CPT

## 2019-07-03 PROCEDURE — OTHER ACNE SURGERY: OTHER

## 2019-07-03 PROCEDURE — OTHER INTRALESIONAL KENALOG: OTHER

## 2019-07-03 PROCEDURE — 11900 INJECT SKIN LESIONS </W 7: CPT

## 2019-07-03 PROCEDURE — OTHER OTHER (COSMETIC): OTHER

## 2019-07-03 ASSESSMENT — LOCATION SIMPLE DESCRIPTION DERM
LOCATION SIMPLE: CHEST
LOCATION SIMPLE: ABDOMEN
LOCATION SIMPLE: LEFT BREAST

## 2019-07-03 ASSESSMENT — LOCATION DETAILED DESCRIPTION DERM
LOCATION DETAILED: LEFT MEDIAL SUPERIOR CHEST
LOCATION DETAILED: RIGHT RIB CAGE
LOCATION DETAILED: LEFT LATERAL BREAST 3-4:00 REGION
LOCATION DETAILED: LEFT INFRAMAMMARY CREASE (OUTER QUADRANT)

## 2019-07-03 ASSESSMENT — LOCATION ZONE DERM: LOCATION ZONE: TRUNK

## 2019-07-03 NOTE — PROCEDURE: INTRALESIONAL KENALOG
Medical Necessity Clause: This procedure was medically necessary because the lesions that were treated were:
Include Z78.9 (Other Specified Conditions Influencing Health Status) As An Associated Diagnosis?: No
Concentration Of Solution Injected (Mg/Ml): 3.0
Consent: The risks of atrophy were reviewed with the patient.
Kenalog Preparation: Kenalog
X Size Of Lesion In Cm (Optional): 0
Administered By (Optional): Heidi
Total Volume Injected (Ccs- Only Use Numbers And Decimals): 0.2
Detail Level: Simple

## 2019-07-03 NOTE — PROCEDURE: MIPS QUALITY
Quality 110: Preventive Care And Screening: Influenza Immunization: Influenza immunization was not ordered or administered, reason not given
Quality 130: Documentation Of Current Medications In The Medical Record: Current Medications Documented
Detail Level: Detailed
Quality 431: Preventive Care And Screening: Unhealthy Alcohol Use - Screening: Patient screened for unhealthy alcohol use using a single question and scores less than 2 times per year
Quality 226: Preventive Care And Screening: Tobacco Use: Screening And Cessation Intervention: Patient screened for tobacco use and is an ex/non-smoker

## 2019-07-03 NOTE — PROCEDURE: ACNE SURGERY
Consent was obtained and risks were reviewed including but not limited to scarring, infection, bleeding, scabbing, incomplete removal, and allergy to anesthesia.
Acne Type: Comedonal Lesions
Render Post-Care Instructions In Note?: no
Post-Care Instructions: I reviewed with the patient in detail post-care instructions. Patient is to keep the treatment areaas dry overnight, and then apply bacitracin twice daily until healed. Patient may apply hydrogen peroxide soaks to remove any crusting.
Prep Text (Optional): Prior to removal the treatment areas were prepped in the usual fashion.\\nAcne Surgery with Shahid Acleara\\nPE Indication: improvement of open and closed comedones, papules, pustules, acne cysts\\nTreatment #\\nSite(s):\\nenergy:\\nvac level:\\npulse mode:\\npulse width/ms:\\nTotal Passes:\\n\\nAcne surgery with Lancet and Comedone Extractor:   Yes\\nIntralesional Injections with Kenalog preparation:      No                                          \\nConcentration: N/A\\nTotal Volume: N/A\\nProducts Applied: Pore Therapy and Cetaphil Oil Control Moisturizer w/SPF30\\n\\n\\Kim indications, treatment expectations (including management of any possible irritations), protocols, risks and benefits, pre/post care are reviewed. Details of these can be found on the appropriate attached informed consent documentation. Patient understands that multiple treatments may be necessary for optimum results and that ongoing maintenance with at-home products and additional office visits or treatments may be needed to enhance and extend the desired results.\\nStandard protocol was done. Reviewed with patient that ILS injections as well as manual extractions may be recommended for optimum results. These will be considered prn.  Following treatment, the expected mild erythema was observed. Patient tolerated the procedure well without immediate bruising or complication. Post care was reviewed.\\nRTC:
Detail Level: Zone
Extraction Method: lancet and extractor

## 2019-07-03 NOTE — PROCEDURE: OTHER (COSMETIC)
Other (Free Text): Silk Peel Dermalinfusion\\nPE: skin maintenance\\n\\nTreatment #1/1\\nTreatment Site:full face\\n\\nCleanse: Life Line Polishing Gelee\\n\\nSelected Serum: Hydrating Serum followed by Vitamin C Serum\\n\\nGrit Size:9-30\\n\\nPSI:2.6 - 2.7\\n\\nFollowed by Massaging utilizing a Cherry Berry Mask (occluding with a warm king towel after the massage)\\n\\nPost procedure products:ZO Exfoliation Excelerator, iS Clinical Youth Eye, ZO Smart Tone, Elta MD Lip Balm\\n\\nNotes:\\n\\n\\Kim indications, treatment expectations (including management of any possible irritations), protocols, risks and benefits, pre/post care are reviewed. Details of these can be found on the appropriate attached informed consent documentation. Patient understands that multiple treatments may be necessary for optimum results and that ongoing maintenance with at-home products and additional office visits or treatments may be needed to enhance and extend the desired results.\\nStandard protocol was done. The eyes were covered with specific eyeshields. Following treatment, the expected mild erythema was observed. Patient tolerated the procedure well without immediate complication. Post care was reviewed and a follow up appointment was recommended.\\n\\nAnswered all questions\\nRTC:4-6 weeks for TheraClear and facial
Detail Level: Zone

## 2019-07-03 NOTE — HPI: OTHER
Condition:: Cosmetic
Please Describe Your Condition:: Pt presents for a facial.  She did not specify and specific concerns and wanted to leave it up to Maylin’s discretion.
Condition:: Acne
Please Describe Your Condition:: Pt presents for TheraClear tx #3 to her axilla and under arms.  Medications, allergies and medical hx were reviewed.

## 2019-07-15 ENCOUNTER — APPOINTMENT (OUTPATIENT)
Dept: URBAN - METROPOLITAN AREA CLINIC 211 | Age: 54
Setting detail: DERMATOLOGY
End: 2019-07-16

## 2019-07-15 DIAGNOSIS — L72.8 OTHER FOLLICULAR CYSTS OF THE SKIN AND SUBCUTANEOUS TISSUE: ICD-10-CM

## 2019-07-15 PROCEDURE — OTHER MIPS QUALITY: OTHER

## 2019-07-15 PROCEDURE — OTHER INTRALESIONAL KENALOG: OTHER

## 2019-07-15 PROCEDURE — 11900 INJECT SKIN LESIONS </W 7: CPT

## 2019-07-15 ASSESSMENT — LOCATION SIMPLE DESCRIPTION DERM: LOCATION SIMPLE: ABDOMEN

## 2019-07-15 ASSESSMENT — LOCATION DETAILED DESCRIPTION DERM
LOCATION DETAILED: SUBXIPHOID
LOCATION DETAILED: RIGHT LATERAL ABDOMEN

## 2019-07-15 ASSESSMENT — LOCATION ZONE DERM: LOCATION ZONE: TRUNK

## 2019-07-15 NOTE — HPI: OTHER
Condition:: Cosmetic
Please Describe Your Condition:: comes in for a chief complaint of Cosmetic. Patient presents for an intralesional injection of an inflammatory acne cyst beneath the right breast and R flank. Patient denies having any additional concerns. Medications, allergies, and medical hx were reviewed.

## 2019-08-02 ENCOUNTER — APPOINTMENT (OUTPATIENT)
Dept: URBAN - METROPOLITAN AREA CLINIC 211 | Age: 54
Setting detail: DERMATOLOGY
End: 2019-08-02

## 2019-08-02 DIAGNOSIS — Z41.9 ENCOUNTER FOR PROCEDURE FOR PURPOSES OTHER THAN REMEDYING HEALTH STATE, UNSPECIFIED: ICD-10-CM

## 2019-08-02 DIAGNOSIS — L72.8 OTHER FOLLICULAR CYSTS OF THE SKIN AND SUBCUTANEOUS TISSUE: ICD-10-CM

## 2019-08-02 DIAGNOSIS — L70.0 ACNE VULGARIS: ICD-10-CM

## 2019-08-02 PROCEDURE — OTHER OTHER (COSMETIC): OTHER

## 2019-08-02 PROCEDURE — 11900 INJECT SKIN LESIONS </W 7: CPT

## 2019-08-02 PROCEDURE — 10040 ACNE SURGERY: CPT

## 2019-08-02 PROCEDURE — OTHER INTRALESIONAL KENALOG: OTHER

## 2019-08-02 PROCEDURE — OTHER ACNE SURGERY: OTHER

## 2019-08-02 PROCEDURE — OTHER MIPS QUALITY: OTHER

## 2019-08-02 ASSESSMENT — LOCATION ZONE DERM: LOCATION ZONE: TRUNK

## 2019-08-02 ASSESSMENT — LOCATION SIMPLE DESCRIPTION DERM
LOCATION SIMPLE: ABDOMEN
LOCATION SIMPLE: CHEST

## 2019-08-02 ASSESSMENT — LOCATION DETAILED DESCRIPTION DERM
LOCATION DETAILED: LEFT MEDIAL SUPERIOR CHEST
LOCATION DETAILED: RIGHT RIB CAGE
LOCATION DETAILED: LEFT RIB CAGE

## 2019-08-02 NOTE — PROCEDURE: INTRALESIONAL KENALOG
Medical Necessity Clause: This procedure was medically necessary because the lesions that were treated were:
Consent: The risks of atrophy were reviewed with the patient.
Total Volume Injected (Ccs- Only Use Numbers And Decimals): 0.25
X Size Of Lesion In Cm (Optional): 0
Include Z78.9 (Other Specified Conditions Influencing Health Status) As An Associated Diagnosis?: No
Kenalog Preparation: Kenalog
Administered By (Optional): Heidi
Concentration Of Solution Injected (Mg/Ml): 3.0
Detail Level: Simple

## 2019-08-02 NOTE — PROCEDURE: ACNE SURGERY
Prep Text (Optional): Prior to removal the treatment areas were prepped in the usual fashion.\\nAcne Surgery with Shahid Acleara\\nPE Indication: improvement of open and closed comedones, papules, pustules, acne cysts\\nTreatment #4\\nSite(s):full face\\nenergy:5\\nvac level:2\\npulse mode:long\\npulse width/ms:single\\nTotal Passes:3\\n\\nAcne surgery with Lancet and Comedone Extractor:   Yes\\nIntralesional Injections with Kenalog preparation:      Yes                                  \\nConcentration: TAC 3\\nTotal Volume: 0.25cc\\n\\n\\n\\Kmi indications, treatment expectations (including management of any possible irritations), protocols, risks and benefits, pre/post care are reviewed. Details of these can be found on the appropriate attached informed consent documentation. Patient understands that multiple treatments may be necessary for optimum results and that ongoing maintenance with at-home products and additional office visits or treatments may be needed to enhance and extend the desired results.\\nStandard protocol was done. Reviewed with patient that ILS injections as well as manual extractions may be recommended for optimum results. These will be considered prn.  Following treatment, the expected mild erythema was observed. Patient tolerated the procedure well without immediate bruising or complication. Post care was reviewed. Prep Text (Optional): Prior to removal the treatment areas were prepped in the usual fashion.\\nAcne Surgery with Shahid Acleara\\nPE Indication: improvement of open and closed comedones, papules, pustules, acne cysts\\nTreatment #4\\nSite(s):full face\\nenergy:5\\nvac level:2\\npulse mode:long\\npulse width/ms:single\\nTotal Passes:3\\n\\nAcne surgery with Lancet and Comedone Extractor:   Yes\\nIntralesional Injections with Kenalog preparation:      Yes                                  \\nConcentration: TAC 3\\nTotal Volume: 0.25cc\\n\\n\\n\\Kim indications, treatment expectations (including management of any possible irritations), protocols, risks and benefits, pre/post care are reviewed. Details of these can be found on the appropriate attached informed consent documentation. Patient understands that multiple treatments may be necessary for optimum results and that ongoing maintenance with at-home products and additional office visits or treatments may be needed to enhance and extend the desired results.\\nStandard protocol was done. Reviewed with patient that ILS injections as well as manual extractions may be recommended for optimum results. These will be considered prn.  Following treatment, the expected mild erythema was observed. Patient tolerated the procedure well without immediate bruising or complication. Post care was reviewed.

## 2019-08-02 NOTE — PROCEDURE: OTHER (COSMETIC)
Other (Free Text): Silk Peel Dermalinfusion\\nPE: skin maintenance\\n\\nTreatment #1/1\\nTreatment Site:full face\\n\\nPrior to tx skin was cleansed with the [employer] Brightening Cleanser followed by the [facility] Ref[facility] Polishing Gelee\\n\\nSelected Serum:Vitamin C\\nGrit Size:9-60\\nPSI:2.5-2.6\\n\\nFollowed by PCA Nutrient Toner followed by massage utilizing a Hydrotant Marine Mask (occluding with a warm king towel after the massage)\\n\\nPost procedure products:ZO Bright Alive, [place] Antioxidant Mist, iS Clinical Youth Eye Complex, ZO Smart Tone, PCA HA Lip Booster\\n\\nNotes:\\n\\n\\Kim indications, treatment expectations (including management of any possible irritations), protocols, risks and benefits, pre/post care are reviewed. Details of these can be found on the appropriate attached informed consent documentation. Patient understands that multiple treatments may be necessary for optimum results and that ongoing maintenance with at-home products and additional office visits or treatments may be needed to enhance and extend the desired results.\\nStandard protocol was done. The eyes were covered with specific eyeshields. Following treatment, the expected mild erythema was observed. Patient tolerated the procedure well without immediate complication. Post care was reviewed and a follow up appointment was recommended.\\n\\nAnswered all questions\\nRTC:4-6 weeks facial Other (Free Text): Silk Peel Dermalinfusion\\nPE: skin maintenance\\n\\nTreatment #1/1\\nTreatment Site:full face\\n\\nPrior to tx skin was cleansed with the [employer] Brightening Cleanser followed by the [facility] Ref[facility] Polishing Gelee\\n\\nSelected Serum:Vitamin C\\nGrit Size:9-60\\nPSI:2.5-2.6\\n\\nFollowed by PCA Nutrient Toner followed by massage utilizing a Hydrotant Marine Mask (occluding with a warm king towel after the massage)\\n\\nPost procedure products:ZO Bright Alive, [place] Antioxidant Mist, iS Clinical Youth Eye Complex, ZO Smart Tone, PCA HA Lip Booster\\n\\nNotes:\\n\\n\\Kim indications, treatment expectations (including management of any possible irritations), protocols, risks and benefits, pre/post care are reviewed. Details of these can be found on the appropriate attached informed consent documentation. Patient understands that multiple treatments may be necessary for optimum results and that ongoing maintenance with at-home products and additional office visits or treatments may be needed to enhance and extend the desired results.\\nStandard protocol was done. The eyes were covered with specific eyeshields. Following treatment, the expected mild erythema was observed. Patient tolerated the procedure well without immediate complication. Post care was reviewed and a follow up appointment was recommended.\\n\\nAnswered all questions\\nRTC:4-6 weeks facial

## 2019-08-02 NOTE — HPI: OTHER
Condition:: Cosmetic
Please Describe Your Condition:: Pt presents for TheraClear tx #4 to axilla and under her breasts.  Pt denies any specific concerns of her skin.  Medications, allergies and medical hx were reviewed.

## 2019-08-02 NOTE — PROCEDURE: MIPS QUALITY
Detail Level: Detailed
Quality 431: Preventive Care And Screening: Unhealthy Alcohol Use - Screening: Patient screened for unhealthy alcohol use using a single question and scores less than 2 times per year
Quality 130: Documentation Of Current Medications In The Medical Record: Current Medications Documented
Quality 226: Preventive Care And Screening: Tobacco Use: Screening And Cessation Intervention: Patient screened for tobacco use and is an ex/non-smoker
Quality 474: Zoster Vaccination Status: Shingrix Vaccination not Administered or Previously Received, Reason not Otherwise Specified
Quality 110: Preventive Care And Screening: Influenza Immunization: Influenza immunization was not ordered or administered, reason not given

## 2019-09-13 ENCOUNTER — APPOINTMENT (OUTPATIENT)
Dept: URBAN - METROPOLITAN AREA CLINIC 211 | Age: 54
Setting detail: DERMATOLOGY
End: 2019-09-22

## 2019-09-13 DIAGNOSIS — Z41.9 ENCOUNTER FOR PROCEDURE FOR PURPOSES OTHER THAN REMEDYING HEALTH STATE, UNSPECIFIED: ICD-10-CM

## 2019-09-13 DIAGNOSIS — L70.0 ACNE VULGARIS: ICD-10-CM

## 2019-09-13 DIAGNOSIS — L72.8 OTHER FOLLICULAR CYSTS OF THE SKIN AND SUBCUTANEOUS TISSUE: ICD-10-CM

## 2019-09-13 PROCEDURE — 10040 ACNE SURGERY: CPT

## 2019-09-13 PROCEDURE — OTHER OTHER (COSMETIC): OTHER

## 2019-09-13 PROCEDURE — OTHER ACNE SURGERY: OTHER

## 2019-09-13 PROCEDURE — OTHER OTHER: OTHER

## 2019-09-13 PROCEDURE — OTHER TREATMENT REGIMEN: OTHER

## 2019-09-13 PROCEDURE — OTHER COUNSELING: OTHER

## 2019-09-13 PROCEDURE — OTHER MIPS QUALITY: OTHER

## 2019-09-13 ASSESSMENT — LOCATION SIMPLE DESCRIPTION DERM
LOCATION SIMPLE: ABDOMEN
LOCATION SIMPLE: CHEST

## 2019-09-13 ASSESSMENT — LOCATION DETAILED DESCRIPTION DERM
LOCATION DETAILED: LEFT MEDIAL SUPERIOR CHEST
LOCATION DETAILED: LEFT RIB CAGE

## 2019-09-13 ASSESSMENT — LOCATION ZONE DERM: LOCATION ZONE: TRUNK

## 2019-09-13 NOTE — PROCEDURE: MIPS QUALITY
Detail Level: Detailed
Quality 130: Documentation Of Current Medications In The Medical Record: Current Medications Documented
Quality 431: Preventive Care And Screening: Unhealthy Alcohol Use - Screening: Patient screened for unhealthy alcohol use using a single question and scores less than 2 times per year
Quality 226: Preventive Care And Screening: Tobacco Use: Screening And Cessation Intervention: Patient screened for tobacco use and is an ex/non-smoker
Quality 474: Zoster Vaccination Status: Shingrix Vaccination not Administered or Previously Received, Reason not Otherwise Specified
Quality 110: Preventive Care And Screening: Influenza Immunization: Influenza immunization was not ordered or administered, reason not given

## 2019-09-13 NOTE — PROCEDURE: ACNE SURGERY
Post-Care Instructions: I reviewed with the patient in detail post-care instructions. Patient is to keep the treatment areaas dry overnight, and then apply bacitracin twice daily until healed. Patient may apply hydrogen peroxide soaks to remove any crusting.
Detail Level: Zone
Acne Type: Comedonal Lesions
Render Post-Care Instructions In Note?: no
Consent was obtained and risks were reviewed including but not limited to scarring, infection, bleeding, scabbing, incomplete removal, and allergy to anesthesia.
Extraction Method: lancet and extractor
Prep Text (Optional): Prior to removal the treatment areas were prepped in the usual fashion.\\nAcne Surgery with Shahid Acleara\\nPE Indication: improvement of open and closed comedones, papules, pustules, acne cysts\\nTreatment #5\\nSite(s):under breast and axilla\\nenergy:7\\nvac level:1\\npulse mode:long\\npulse width/ms:single\\nTotal Passes:3\\n\\nAcne surgery with Lancet and Comedone Extractor:   Yes\\nIntralesional Injections with Kenalog preparation:      No                                          \\nConcentration: N/A\\nTotal Volume: N/A\\nProducts Applied: Pore Therapy\\n\\n\\Kim indications, treatment expectations (including management of any possible irritations), protocols, risks and benefits, pre/post care are reviewed. Details of these can be found on the appropriate attached informed consent documentation. Patient understands that multiple treatments may be necessary for optimum results and that ongoing maintenance with at-home products and additional office visits or treatments may be needed to enhance and extend the desired results.\\nStandard protocol was done. Reviewed with patient that ILS injections as well as manual extractions may be recommended for optimum results. These will be considered prn.  Following treatment, the expected mild erythema was observed. Patient tolerated the procedure well without immediate bruising or complication. Post care was reviewed.\\nRTC:4 weeks for TheraClear tx #6

## 2019-09-13 NOTE — HPI: OTHER
Condition:: Cosmetic and acne
Please Describe Your Condition:: Pt presents for TheraClear tx #5 for under breasts and axilla.  Pt also presents for a silk peel.  Pt denies any specific concerns today.  Medications, allergies and medical hx were reviewed.

## 2019-09-13 NOTE — PROCEDURE: OTHER
Detail Level: Generalized
Other (Free Text): Patient was evaluated at time of cosmetic treatment by LVJ if no improvement will return with LVJ.
Note Text (......Xxx Chief Complaint.): This diagnosis correlates with the

## 2019-09-13 NOTE — PROCEDURE: TREATMENT REGIMEN
Samples Given: Targadox 50mg - take 2 tablets daily for 10 days (samples only)
Detail Level: Detailed

## 2019-09-13 NOTE — PROCEDURE: OTHER (COSMETIC)
Other (Free Text): Silk Peel Dermalinfusion\\nPE:  skin maintenance\\n\\nTreatment #1/1\\nTreatment Site:full face\\n\\nPrior to beginning treatment the face was cleansed with Areli Brightening Cleanser and Areli Gentle Antioxidant Soothing Cleanser followed by the PCA Nutrient Toner\\n\\nSelected Serum:Vitamin C\\nGrit Size:9-60\\nPSI:2.8-2.9\\n\\nFollowed by Massaging utilizing a Phyto [place] Enzyme Peel(occluding with a warm king towel after the massage)\\n\\nPost procedure products:[place] Antioxidant Mist, ZO Exfoliation Excelerator, ZO Enzymatic Peel, ZO Smart Tone, iS [facility] Youth Eye, Elta Md Lip [place]\\n\\n\\n\\Kim indications, treatment expectations (including management of any possible irritations), protocols, risks and benefits, pre/post care are reviewed. Details of these can be found on the appropriate attached informed consent documentation. Patient understands that multiple treatments may be necessary for optimum results and that ongoing maintenance with at-home products and additional office visits or treatments may be needed to enhance and extend the desired results.\\nStandard protocol was done. The eyes were covered with specific eyeshields. Following treatment, the expected mild erythema was observed. Patient tolerated the procedure well without immediate complication. Post care was reviewed and a follow up appointment was recommended.\\n\\nAnswered all questions\\nRTC:4 weeks Silk Peel/facial Other (Free Text): Silk Peel Dermalinfusion\\nPE:  skin maintenance\\n\\nTreatment #1/1\\nTreatment Site:full face\\n\\nPrior to beginning treatment the face was cleansed with Areli Brightening Cleanser and Areli Gentle Antioxidant Soothing Cleanser followed by the PCA Nutrient Toner\\n\\nSelected Serum:Vitamin C\\nGrit Size:9-60\\nPSI:2.8-2.9\\n\\nFollowed by Massaging utilizing a Phyto [place] Enzyme Peel(occluding with a warm king towel after the massage)\\n\\nPost procedure products:[place] Antioxidant Mist, ZO Exfoliation Excelerator, ZO Enzymatic Peel, ZO Smart Tone, iS Clinic[facility] Youth Eye, Elta Md Lip [place]\\n\\n\\n\\Kim indications, treatment expectations (including management of any possible irritations), protocols, risks and benefits, pre/post care are reviewed. Details of these can be found on the appropriate attached informed consent documentation. Patient understands that multiple treatments may be necessary for optimum results and that ongoing maintenance with at-home products and additional office visits or treatments may be needed to enhance and extend the desired results.\\nStandard protocol was done. The eyes were covered with specific eyeshields. Following treatment, the expected mild erythema was observed. Patient tolerated the procedure well without immediate complication. Post care was reviewed and a follow up appointment was recommended.\\n\\nAnswered all questions\\nRTC:4 weeks Silk Peel/facial

## 2019-10-04 ENCOUNTER — APPOINTMENT (OUTPATIENT)
Dept: URBAN - METROPOLITAN AREA CLINIC 220 | Age: 54
Setting detail: DERMATOLOGY
End: 2019-10-07

## 2019-10-04 ENCOUNTER — APPOINTMENT (OUTPATIENT)
Dept: URBAN - METROPOLITAN AREA CLINIC 211 | Age: 54
Setting detail: DERMATOLOGY
End: 2019-10-04

## 2019-10-04 DIAGNOSIS — L70.0 ACNE VULGARIS: ICD-10-CM

## 2019-10-04 DIAGNOSIS — L98.8 OTHER SPECIFIED DISORDERS OF THE SKIN AND SUBCUTANEOUS TISSUE: ICD-10-CM

## 2019-10-04 PROCEDURE — OTHER MIPS QUALITY: OTHER

## 2019-10-04 PROCEDURE — 10040 ACNE SURGERY: CPT

## 2019-10-04 PROCEDURE — OTHER COUNSELING: OTHER

## 2019-10-04 PROCEDURE — OTHER ACNE SURGERY: OTHER

## 2019-10-04 PROCEDURE — OTHER BOTOX: OTHER

## 2019-10-04 ASSESSMENT — LOCATION DETAILED DESCRIPTION DERM: LOCATION DETAILED: LEFT INFERIOR MEDIAL MALAR CHEEK

## 2019-10-04 ASSESSMENT — LOCATION SIMPLE DESCRIPTION DERM: LOCATION SIMPLE: LEFT CHEEK

## 2019-10-04 ASSESSMENT — LOCATION ZONE DERM: LOCATION ZONE: FACE

## 2019-10-04 NOTE — HPI: COSMETIC (BOTOX)
Have You Had Botox Before?: has had botox
Additional History: Pain 0-10
When Was Your Last Botox Treatment?: 11/30/2018

## 2019-10-04 NOTE — PROCEDURE: ACNE SURGERY
Render Post-Care Instructions In Note?: no
Prep Text (Optional): Prior to removal the treatment areas were prepped in the usual fashion.\\n\\nAcne Surgery with TheraCkear\\n\\nPE Indication: improvement of open and closed comedones, papules, pustules, acne cysts\\n\\nTreatment #6\\nSite(s):axilla and under breast\\n\\nEnergy:5-6\\nVac Level:1\\nPulse Mode:long\\nPulse Width/ms:single\\nTotal Passes:2\\n\\nAcne surgery with Lancet and Comedone Extractor:   Yes\\nIntralesional Injections with Kenalog preparation:      No                                          \\nConcentration: N/A\\nTotal Volume: N/A\\nProducts Applied: ZO Complexion Renewal\\n\\nNote:  \\n\\n1.  Rec pt f/u with LVJ if cysts persist or worsen.\\n\\n\\Kim indications, treatment expectations (including management of any possible irritations), protocols, risks and benefits, pre/post care are reviewed. Details of these can be found on the appropriate attached informed consent documentation. Patient understands that multiple treatments may be necessary for optimum results and that on going maintenance with at-home products and additional office visits or treatments may be needed to enhance and extend the desired results.\\n\\nStandard protocol was done. Reviewed with patient that ILS injections as well as manual extractions may be recommended for optimum results. These will be considered prn.  Following treatment, the expected mild erythema was observed. Patient tolerated the procedure well without immediate bruising or complication. Post care was reviewed.\\n\\nRTC:2-4 weeks for TheraClear tx #7
Acne Type: Comedonal Lesions
Extraction Method: lancet and extractor
Post-Care Instructions: I reviewed with the patient in detail post-care instructions. Patient is to keep the treatment areaas dry overnight, and then apply bacitracin twice daily until healed. Patient may apply hydrogen peroxide soaks to remove any crusting.
Detail Level: Zone

## 2019-10-04 NOTE — PROCEDURE: MIPS QUALITY
Quality 474: Zoster Vaccination Status: Shingrix Vaccination Administered or Previously Received
Quality 226: Preventive Care And Screening: Tobacco Use: Screening And Cessation Intervention: Patient screened for tobacco use and is an ex/non-smoker
Quality 431: Preventive Care And Screening: Unhealthy Alcohol Use - Screening: Patient screened for unhealthy alcohol use using a single question and scores less than 2 times per year
Quality 400a: One-Time Screening For Hepatitis C Virus (Hcv) For All Patients: Patient received one-time screening for HCV infection
Quality 130: Documentation Of Current Medications In The Medical Record: Current Medications Documented
Quality 110: Preventive Care And Screening: Influenza Immunization: Influenza immunization was not ordered or administered, reason not given
Detail Level: Detailed

## 2019-10-04 NOTE — HPI: OTHER
Condition:: Acne
Please Describe Your Condition:: comes in for a chief complaint of Acne. Pt presents for TheraClear tx # 6 to axilla and under breast. On Right Breast Deep NC Lesion (approx 12-15 mm).  Pt states it is occurred when she began the Targadox per LVJ.  She reports seeing a large erythematous ring surrounding the lesion which has since resolved. Reports cyst inflammation is improving.  Medications, allergies and medical hx were reviewed.

## 2019-10-04 NOTE — PROCEDURE: MIPS QUALITY
Quality 131: Pain Assessment And Follow-Up: Pain assessment using a standardized tool is documented as negative, no follow-up plan required
Quality 400a: One-Time Screening For Hepatitis C Virus (Hcv) For All Patients: Patient received one-time screening for HCV infection
Quality 431: Preventive Care And Screening: Unhealthy Alcohol Use - Screening: Patient screened for unhealthy alcohol use using a single question and scores less than 2 times per year
Quality 474: Zoster Vaccination Status: Shingrix Vaccination Administered or Previously Received
Quality 226: Preventive Care And Screening: Tobacco Use: Screening And Cessation Intervention: Patient screened for tobacco use and is an ex/non-smoker
Quality 110: Preventive Care And Screening: Influenza Immunization: Influenza immunization was not ordered or administered, reason not given
Quality 130: Documentation Of Current Medications In The Medical Record: Current Medications Documented
Detail Level: Detailed

## 2019-11-08 ENCOUNTER — APPOINTMENT (OUTPATIENT)
Dept: URBAN - METROPOLITAN AREA CLINIC 211 | Age: 54
Setting detail: DERMATOLOGY
End: 2019-11-11

## 2019-11-08 ENCOUNTER — APPOINTMENT (OUTPATIENT)
Dept: URBAN - METROPOLITAN AREA CLINIC 211 | Age: 54
Setting detail: DERMATOLOGY
End: 2019-11-10

## 2019-11-08 DIAGNOSIS — L98.8 OTHER SPECIFIED DISORDERS OF THE SKIN AND SUBCUTANEOUS TISSUE: ICD-10-CM

## 2019-11-08 DIAGNOSIS — L90.5 SCAR CONDITIONS AND FIBROSIS OF SKIN: ICD-10-CM

## 2019-11-08 DIAGNOSIS — L70.0 ACNE VULGARIS: ICD-10-CM

## 2019-11-08 DIAGNOSIS — L73.2 HIDRADENITIS SUPPURATIVA: ICD-10-CM

## 2019-11-08 PROBLEM — L57.0 ACTINIC KERATOSIS: Status: ACTIVE | Noted: 2019-11-08

## 2019-11-08 PROBLEM — L85.3 XEROSIS CUTIS: Status: ACTIVE | Noted: 2019-11-08

## 2019-11-08 PROCEDURE — OTHER COSMETIC CONSULTATION: FILLERS: OTHER

## 2019-11-08 PROCEDURE — 10040 ACNE SURGERY: CPT

## 2019-11-08 PROCEDURE — OTHER TREATMENT REGIMEN: OTHER

## 2019-11-08 PROCEDURE — OTHER MIPS QUALITY: OTHER

## 2019-11-08 PROCEDURE — OTHER FILLERS: OTHER

## 2019-11-08 PROCEDURE — OTHER COUNSELING: OTHER

## 2019-11-08 PROCEDURE — OTHER OTHER (COSMETIC): OTHER

## 2019-11-08 PROCEDURE — OTHER ACNE SURGERY: OTHER

## 2019-11-08 PROCEDURE — 99213 OFFICE O/P EST LOW 20 MIN: CPT

## 2019-11-08 PROCEDURE — OTHER PRESCRIPTION: OTHER

## 2019-11-08 RX ORDER — CLINDAMYCIN PHOSPHATE 10 MG/ML
SOLUTION TOPICAL
Qty: 1 | Refills: 3 | Status: ERX | COMMUNITY
Start: 2019-11-08

## 2019-11-08 RX ORDER — DOXYCYCLINE HYCLATE 20 MG/1
TABLET, FILM COATED ORAL
Qty: 60 | Refills: 3 | Status: ERX

## 2019-11-08 ASSESSMENT — LOCATION DETAILED DESCRIPTION DERM
LOCATION DETAILED: RIGHT ANTERIOR PROXIMAL THIGH
LOCATION DETAILED: LEFT PROXIMAL POSTERIOR THIGH
LOCATION DETAILED: RIGHT GLUTEAL CREASE
LOCATION DETAILED: RIGHT INFRAMAMMARY CREASE (INNER QUADRANT)
LOCATION DETAILED: GLABELLA
LOCATION DETAILED: LEFT CENTRAL MALAR CHEEK
LOCATION DETAILED: LEFT INFERIOR MEDIAL MALAR CHEEK
LOCATION DETAILED: RIGHT PROXIMAL POSTERIOR THIGH
LOCATION DETAILED: RIGHT CENTRAL MALAR CHEEK
LOCATION DETAILED: LEFT GLUTEAL CREASE
LOCATION DETAILED: LEFT INFRAMAMMARY CREASE (INNER QUADRANT)
LOCATION DETAILED: LEFT ANTERIOR PROXIMAL THIGH

## 2019-11-08 ASSESSMENT — LOCATION SIMPLE DESCRIPTION DERM
LOCATION SIMPLE: LEFT BREAST
LOCATION SIMPLE: RIGHT THIGH
LOCATION SIMPLE: LEFT POSTERIOR THIGH
LOCATION SIMPLE: LEFT GUTEAL CREASE
LOCATION SIMPLE: LEFT CHEEK
LOCATION SIMPLE: LEFT CHEEK
LOCATION SIMPLE: RIGHT CHEEK
LOCATION SIMPLE: RIGHT GLUTEAL CREASE
LOCATION SIMPLE: LEFT THIGH
LOCATION SIMPLE: GLABELLA
LOCATION SIMPLE: RIGHT POSTERIOR THIGH
LOCATION SIMPLE: RIGHT BREAST

## 2019-11-08 ASSESSMENT — LOCATION ZONE DERM
LOCATION ZONE: LEG
LOCATION ZONE: FACE
LOCATION ZONE: FACE
LOCATION ZONE: TRUNK

## 2019-11-08 NOTE — PROCEDURE: MIPS QUALITY
Quality 474: Zoster Vaccination Status: Shingrix Vaccination Administered or Previously Received
Quality 431: Preventive Care And Screening: Unhealthy Alcohol Use - Screening: Patient screened for unhealthy alcohol use using a single question and scores less than 2 times per year
Quality 110: Preventive Care And Screening: Influenza Immunization: Influenza immunization was not ordered or administered, reason not given
Quality 130: Documentation Of Current Medications In The Medical Record: Current Medications Documented
Detail Level: Detailed
Quality 226: Preventive Care And Screening: Tobacco Use: Screening And Cessation Intervention: Patient screened for tobacco use and is an ex/non-smoker
Quality 131: Pain Assessment And Follow-Up: Pain assessment using a standardized tool is documented as negative, no follow-up plan required
Quality 400a: One-Time Screening For Hepatitis C Virus (Hcv) For All Patients: Patient received one-time screening for HCV infection

## 2019-11-08 NOTE — HPI: OTHER
Condition:: Cosmetic
Please Describe Your Condition:: Pt presents for TheraClear tx # 7 for under breast and axilla.  Medications, allergies and medical hx were reviewed.

## 2019-11-08 NOTE — PROCEDURE: ACNE SURGERY
Acne Type: Comedonal Lesions
Prep Text (Optional): Prior to removal the treatment areas were prepped in the usual fashion.\\n\\nAcne Surgery with TheraClear\\n\\nPE Indication: improvement of open and closed comedones, papules, pustules, acne cysts\\n\\nPrior to treatment, the procedure was reviewed in detail, including post treatment expectations. All questions were answered prior to administering the treatment.\\n\\nTreatment #7\\nSite(s):Full Face\\n\\nEnergy:6\\nVac Level:1\\nPulse Mode:long\\nPulse Width/ms:single\\nTotal Passes:3\\n\\nAcne surgery with Lancet and Comedone Extractor:   Yes\\nIntralesional Injections with Kenalog preparation:      No                                          \\nConcentration: N/A\\nTotal Volume: N/A\\nProducts Applied: Obagi Pore Therapy and Obagi NuDerm Sun Shield SPF 50\\n\\nNote:\\n\\n1.  Rec pt follow up with PA about the HS since it has been 2 + years since she has been seen for it. \\n\\n\\Kim indications, treatment expectations (including management of any possible irritations), protocols, risks and benefits, pre/post care are reviewed. Details of these can be found on the appropriate attached informed consent documentation. Patient understands that multiple treatments may be necessary for optimum results and that on going maintenance with at-home products and additional office visits or treatments may be needed to enhance and extend the desired results.\\n\\nStandard protocol was done. Reviewed with patient that ILS injections as well as manual extractions may be recommended for optimum results. These will be considered prn.  Following treatment, the expected mild erythema was observed. Patient tolerated the procedure well without immediate bruising or complication. Post care was reviewed.\\n\\nRTC:
Detail Level: Zone
Extraction Method: lancet and extractor
Render Number Of Lesions Treated: no
Post-Care Instructions: I reviewed with the patient in detail post-care instructions. Patient is to keep the treatment areaas dry overnight, and then apply bacitracin twice daily until healed. Patient may apply hydrogen peroxide soaks to remove any crusting.

## 2019-11-08 NOTE — HPI: COSMETIC (FILLERS)
Have You Had Fillers Before?: has had fillers
Additional History: Pain 0-10
When Was Your Last Filler Injection?: 12/14/2018

## 2019-11-08 NOTE — PROCEDURE: MIPS QUALITY
Quality 226: Preventive Care And Screening: Tobacco Use: Screening And Cessation Intervention: Patient screened for tobacco use and is an ex/non-smoker
Quality 474: Zoster Vaccination Status: Shingrix Vaccination Administered or Previously Received
Quality 130: Documentation Of Current Medications In The Medical Record: Current Medications Documented
Quality 110: Preventive Care And Screening: Influenza Immunization: Influenza immunization was not ordered or administered, reason not given
Quality 431: Preventive Care And Screening: Unhealthy Alcohol Use - Screening: Patient screened for unhealthy alcohol use using a single question and scores less than 2 times per year
Detail Level: Detailed
Quality 400a: One-Time Screening For Hepatitis C Virus (Hcv) For All Patients: Patient received one-time screening for HCV infection

## 2019-11-08 NOTE — PROCEDURE: TREATMENT REGIMEN
Plan: Discussed with patient that she may also be able to go to [facility] if she is looking for additional treatments. Patient states she would like to start at  because she has a fear of needles. Plan: Discussed with patient that she may also be able to go to [place] if she is looking for additional treatments. Patient states she would like to start at  because she has a fear of needles.

## 2019-11-08 NOTE — PROCEDURE: MIPS QUALITY
Quality 474: Zoster Vaccination Status: Shingrix Vaccination Administered or Previously Received
Quality 400a: One-Time Screening For Hepatitis C Virus (Hcv) For All Patients: Patient received one-time screening for HCV infection
Quality 110: Preventive Care And Screening: Influenza Immunization: Influenza immunization was not ordered or administered, reason not given
Quality 130: Documentation Of Current Medications In The Medical Record: Current Medications Documented
Detail Level: Detailed
Quality 131: Pain Assessment And Follow-Up: Pain assessment using a standardized tool is documented as negative, no follow-up plan required
Quality 226: Preventive Care And Screening: Tobacco Use: Screening And Cessation Intervention: Patient screened for tobacco use and is an ex/non-smoker
Quality 431: Preventive Care And Screening: Unhealthy Alcohol Use - Screening: Patient screened for unhealthy alcohol use using a single question and scores less than 2 times per year

## 2019-12-04 ENCOUNTER — APPOINTMENT (OUTPATIENT)
Dept: URBAN - METROPOLITAN AREA CLINIC 212 | Age: 54
Setting detail: DERMATOLOGY
End: 2019-12-04

## 2019-12-04 DIAGNOSIS — Z41.9 ENCOUNTER FOR PROCEDURE FOR PURPOSES OTHER THAN REMEDYING HEALTH STATE, UNSPECIFIED: ICD-10-CM

## 2019-12-04 PROCEDURE — OTHER OTHER: OTHER

## 2019-12-04 PROCEDURE — OTHER MIPS QUALITY: OTHER

## 2019-12-04 ASSESSMENT — LOCATION ZONE DERM: LOCATION ZONE: LIP

## 2019-12-04 ASSESSMENT — LOCATION DETAILED DESCRIPTION DERM: LOCATION DETAILED: LEFT SUPERIOR VERMILION LIP

## 2019-12-04 ASSESSMENT — LOCATION SIMPLE DESCRIPTION DERM: LOCATION SIMPLE: LEFT LIP

## 2019-12-04 NOTE — PROCEDURE: OTHER
Other (Free Text): Patient followed up in the office today due to feeling a lump in her upper lip. Gentle massaging was performed by JAIME.
Note Text (......Xxx Chief Complaint.): This diagnosis correlates with the
Detail Level: Simple

## 2019-12-04 NOTE — PROCEDURE: MIPS QUALITY
Quality 130: Documentation Of Current Medications In The Medical Record: Current Medications Documented
Quality 226: Preventive Care And Screening: Tobacco Use: Screening And Cessation Intervention: Patient screened for tobacco use and is an ex/non-smoker
Quality 431: Preventive Care And Screening: Unhealthy Alcohol Use - Screening: Patient screened for unhealthy alcohol use using a single question and scores less than 2 times per year
Quality 474: Zoster Vaccination Status: Shingrix Vaccination Administered or Previously Received
Detail Level: Detailed
Quality 400a: One-Time Screening For Hepatitis C Virus (Hcv) For All Patients: Patient received one-time screening for HCV infection
Quality 131: Pain Assessment And Follow-Up: Pain assessment using a standardized tool is documented as negative, no follow-up plan required
Quality 110: Preventive Care And Screening: Influenza Immunization: Influenza Immunization Administered during Influenza season

## 2020-02-28 ENCOUNTER — APPOINTMENT (OUTPATIENT)
Dept: URBAN - METROPOLITAN AREA CLINIC 212 | Age: 55
Setting detail: DERMATOLOGY
End: 2020-03-01

## 2020-02-28 DIAGNOSIS — L70.0 ACNE VULGARIS: ICD-10-CM

## 2020-02-28 DIAGNOSIS — Z41.9 ENCOUNTER FOR PROCEDURE FOR PURPOSES OTHER THAN REMEDYING HEALTH STATE, UNSPECIFIED: ICD-10-CM

## 2020-02-28 PROCEDURE — OTHER ACNE SURGERY: OTHER

## 2020-02-28 PROCEDURE — OTHER MIPS QUALITY: OTHER

## 2020-02-28 PROCEDURE — OTHER OTHER (COSMETIC): OTHER

## 2020-02-28 PROCEDURE — 10040 ACNE SURGERY: CPT

## 2020-02-28 ASSESSMENT — LOCATION SIMPLE DESCRIPTION DERM
LOCATION SIMPLE: LEFT CHEEK
LOCATION SIMPLE: LEFT BREAST

## 2020-02-28 ASSESSMENT — LOCATION DETAILED DESCRIPTION DERM
LOCATION DETAILED: LEFT INFRAMAMMARY CREASE (INNER QUADRANT)
LOCATION DETAILED: LEFT INFERIOR MEDIAL MALAR CHEEK

## 2020-02-28 ASSESSMENT — LOCATION ZONE DERM
LOCATION ZONE: FACE
LOCATION ZONE: TRUNK

## 2020-02-28 NOTE — PROCEDURE: OTHER (COSMETIC)
Other (Free Text): Silk Peel Dermalinfusion\\n\\nPE:\\n \\nPrior to treatment, all but not limited to treatment indications and expectations(including management of any possible irritations) protocols, risks and benefits were reviewed in detail, including post treatment expectations. All questions were answered prior to administering the treatment.\\n\\nTreatment #1/1\\nTreatment Site: full face\\n\\nCleanse: LifeLine refreshing  polishing gelee, LifeLine dual action exfoliater\\n\\nSelected Serum:\\nGrit Size: 9-80\\nPSI: 2.7 - 2.8\\n\\nZO enzymatic peel then Cherry Berry Mask was gently massaged on and occluded with a warm king towel.  Mask was removed within a few minutes or as tolerated.\\n\\nProducts applied: ZO exfoliating accelerator, Elta MD Lip, DEJ eye, Areli SPF+ tint\\n\\nNotes:\\n\\Kim indications, treatment expectations (including management of any possible irritations), protocols, risks and benefits, pre/post care are reviewed. Additional information can be found on consent documentation. Patient understands that multiple treatments may be necessary for optimum results and that on going maintenance with at-home products and additional office visits or treatments may be needed to enhance and extend the desired results.\\n\\nStandard protocol was done.  Following treatment, the expected mild erythema was observed. Patient tolerated the procedure well without immediate complication. Post care was reviewed and a follow up appointment was recommended.\\n\\Kim questions were addressed.\\n\\nRTC: 4-6 weeks
Detail Level: Zone

## 2020-02-28 NOTE — PROCEDURE: MIPS QUALITY
Quality 130: Documentation Of Current Medications In The Medical Record: Current Medications Documented
Quality 110: Preventive Care And Screening: Influenza Immunization: Influenza immunization was not ordered or administered, reason not given
Detail Level: Detailed
Quality 400a: One-Time Screening For Hepatitis C Virus (Hcv) For All Patients: Patient received one-time screening for HCV infection
Quality 431: Preventive Care And Screening: Unhealthy Alcohol Use - Screening: Patient screened for unhealthy alcohol use using a single question and scores less than 2 times per year
Quality 474: Zoster Vaccination Status: Shingrix Vaccination Administered or Previously Received
Quality 226: Preventive Care And Screening: Tobacco Use: Screening And Cessation Intervention: Patient screened for tobacco use and is an ex/non-smoker

## 2020-02-28 NOTE — HPI: OTHER
Condition:: Cosmetic
Please Describe Your Condition:: Pt presents for TheraClear to bilateral axilla and inferior breast, treatment #8.  Pt also presents for silkpeel to full face.  Medications, allergies and medical history were reviewed with patient

## 2020-02-28 NOTE — PROCEDURE: ACNE SURGERY
Prep Text (Optional): Prior to removal the treatment areas were prepped in the usual fashion.\\n\\nAcne Surgery with TheraClear\\n\\nPE: improvement of open and closed comedones, papules, pustules, acne cysts\\n\\nPrior to treatment, the procedure was reviewed in detail, including all but not limited to discomfort with manual extractions, mild erythema and edema immediately following treatment and post care. All questions were answered prior to administering the treatment.\\n\\nTreatment # 8\\nSite(s): bilateral axilla, bilateral inferior breast\\n\\nEnergy: 5\\nVac Level: 1\\nPulse Mode: single\\nPulse Width/ms: short\\nTotal Passes: 2\\n\\nAcne surgery with Lancet and Comedone Extractor:   Yes\\nIntralesional Injections with Kenalog preparation:      No                                          \\nConcentration: N/A\\nTotal Volume: N/A\\nProducts Applied: Obagi Pore Therapy and Obagi NuDerm Sun Shield SPF 50\\n\\Kim indications, treatment expectations (including management of any possible irritations), protocols, risks and benefits, pre/post care are reviewed. Additional information can be found on consent documentation. Patient understands that multiple treatments may be necessary for optimum results and that on going maintenance with at-home products and additional office visits or treatments may be needed to enhance and extend the desired results.\\n\\nStandard protocol was done. Reviewed with patient that ILS injections as well as manual extractions may be recommended for optimum results. These will be considered prn.  Following treatment, the expected mild erythema was observed. Patient tolerated the procedure well without immediate bruising or complication. Post care was reviewed.\\n\\nRTC: 4-6 weeks treatment #9
Post-Care Instructions: I reviewed with the patient in detail post-care instructions. Patient is to keep the treatment areaas dry overnight, and then apply bacitracin twice daily until healed. Patient may apply hydrogen peroxide soaks to remove any crusting.
Render Post-Care Instructions In Note?: no
Acne Type: Comedonal Lesions
Extraction Method: lancet and extractor
Detail Level: Zone

## 2020-04-02 ENCOUNTER — RX ONLY (RX ONLY)
Age: 55
End: 2020-04-02

## 2020-04-02 RX ORDER — CLINDAMYCIN PHOSPHATE 10 MG/ML
SOLUTION TOPICAL
Qty: 1 | Refills: 3 | Status: ERX

## 2020-05-13 ENCOUNTER — APPOINTMENT (OUTPATIENT)
Dept: URBAN - METROPOLITAN AREA CLINIC 211 | Age: 55
Setting detail: DERMATOLOGY
End: 2020-05-19

## 2020-05-13 DIAGNOSIS — L70.0 ACNE VULGARIS: ICD-10-CM

## 2020-05-13 PROCEDURE — OTHER ACNE SURGERY: OTHER

## 2020-05-13 PROCEDURE — 10040 ACNE SURGERY: CPT

## 2020-05-13 PROCEDURE — OTHER MIPS QUALITY: OTHER

## 2020-05-13 ASSESSMENT — LOCATION ZONE DERM: LOCATION ZONE: AXILLAE

## 2020-05-13 ASSESSMENT — LOCATION SIMPLE DESCRIPTION DERM: LOCATION SIMPLE: RIGHT AXILLARY VAULT

## 2020-05-13 ASSESSMENT — LOCATION DETAILED DESCRIPTION DERM: LOCATION DETAILED: RIGHT AXILLARY VAULT

## 2020-05-13 NOTE — PROCEDURE: ACNE SURGERY
Render Number Of Lesions Treated: no
Detail Level: Zone
Post-Care Instructions: I reviewed with the patient in detail post-care instructions. Patient is to keep the treatment areaas dry overnight, and then apply bacitracin twice daily until healed. Patient may apply hydrogen peroxide soaks to remove any crusting.
Extraction Method: lancet and extractor
Acne Type: Comedonal Lesions
Prep Text (Optional): Prior to removal the treatment areas were prepped in the usual fashion.\\n\\nAcne Surgery with TheraClear\\n\\nPE: improvement of open and closed comedones, papules, pustules, acne cysts\\n\\nPrior to treatment, the procedure was reviewed in detail, including all but not limited to discomfort with manual extractions, mild erythema and edema immediately following treatment and post care. All questions were answered prior to administering the treatment.\\n\\nTreatment # 9\\nSite(s): bilateral axilla, bilateral inferior breast\\n\\nEnergy: 5\\nVac Level: 1\\nPulse Mode: single\\nPulse Width/ms: long\\nTotal Passes: 2 with 3rd to select areas\\n\\nAcne surgery with Lancet and Comedone Extractor:   Yes\\nIntralesional Injections with Kenalog preparation:      No                                          \\nConcentration: N/A\\nTotal Volume: N/A\\nProducts Applied: Obagi Pore Therapy and Obagi NuDerm Sun Shield SPF 50\\n\\Kim indications, treatment expectations (including management of any possible irritations), protocols, risks and benefits, pre/post care are reviewed. Additional information can be found on consent documentation. Patient understands that multiple treatments may be necessary for optimum results and that on going maintenance with at-home products and additional office visits or treatments may be needed to enhance and extend the desired results.\\n\\nStandard protocol was done. Reviewed with patient that ILS injections as well as manual extractions may be recommended for optimum results. These will be considered prn.  Following treatment, the expected mild erythema was observed. Patient tolerated the procedure well without immediate bruising or complication. Post care was reviewed.\\n\\nRTC: 4 weeks, treatment # 10

## 2020-05-13 NOTE — PROCEDURE: MIPS QUALITY
Quality 110: Preventive Care And Screening: Influenza Immunization: Influenza immunization was not ordered or administered, reason not given
Quality 400a: One-Time Screening For Hepatitis C Virus (Hcv) For All Patients: Patient received one-time screening for HCV infection
Quality 226: Preventive Care And Screening: Tobacco Use: Screening And Cessation Intervention: Patient screened for tobacco use and is an ex/non-smoker
Detail Level: Detailed
Quality 131: Pain Assessment And Follow-Up: Pain assessment using a standardized tool is documented as negative, no follow-up plan required
Quality 130: Documentation Of Current Medications In The Medical Record: Current Medications Documented
Quality 431: Preventive Care And Screening: Unhealthy Alcohol Use - Screening: Patient screened for unhealthy alcohol use using a single question and scores less than 2 times per year

## 2020-05-13 NOTE — HPI: OTHER
Condition:: Acne
Please Describe Your Condition:: Pt presents for TheraClear to bilateral axilla and bilateral inferior breast, treatment #9.  Pt denies any specific concerns today.  Medications allergies and medical history were reviewed with patient

## 2020-06-18 ENCOUNTER — APPOINTMENT (OUTPATIENT)
Dept: URBAN - METROPOLITAN AREA CLINIC 211 | Age: 55
Setting detail: DERMATOLOGY
End: 2020-06-18

## 2020-06-18 DIAGNOSIS — L70.0 ACNE VULGARIS: ICD-10-CM

## 2020-06-18 PROBLEM — L57.0 ACTINIC KERATOSIS: Status: ACTIVE | Noted: 2020-06-18

## 2020-06-18 PROCEDURE — OTHER ACNE SURGERY: OTHER

## 2020-06-18 PROCEDURE — OTHER INTRALESIONAL KENALOG: OTHER

## 2020-06-18 PROCEDURE — 11900 INJECT SKIN LESIONS </W 7: CPT

## 2020-06-18 PROCEDURE — OTHER MIPS QUALITY: OTHER

## 2020-06-18 PROCEDURE — 10040 ACNE SURGERY: CPT

## 2020-06-18 ASSESSMENT — LOCATION SIMPLE DESCRIPTION DERM
LOCATION SIMPLE: LEFT CHEEK
LOCATION SIMPLE: LEFT BREAST
LOCATION SIMPLE: RIGHT BREAST

## 2020-06-18 ASSESSMENT — LOCATION DETAILED DESCRIPTION DERM
LOCATION DETAILED: LEFT INFRAMAMMARY CREASE (INNER QUADRANT)
LOCATION DETAILED: LEFT INFERIOR MEDIAL MALAR CHEEK
LOCATION DETAILED: RIGHT INFRAMAMMARY CREASE (INNER QUADRANT)

## 2020-06-18 ASSESSMENT — LOCATION ZONE DERM
LOCATION ZONE: TRUNK
LOCATION ZONE: FACE

## 2020-06-18 NOTE — PROCEDURE: ACNE SURGERY
Acne Type: Comedonal Lesions
Prep Text (Optional): Prior to removal the treatment areas were prepped in the usual fashion.\\n\\nAcne Surgery with TheraClear\\n\\nPE: improvement of open and closed comedones, papules, pustules, acne cysts\\n\\nPrior to treatment, the procedure was reviewed in detail, including all but not limited to discomfort with manual extractions, mild erythema and edema immediately following treatment and post care. All questions were answered prior to administering the treatment.\\n\\nTreatment # 10\\nSite(s): bilateral axilla, bilateral inferior breast \\n\\nEnergy: 6\\nVac Level: 1\\nPulse Mode: single\\nPulse Width/ms: long\\nTotal Passes: 3\\n\\nAcne surgery with Lancet and Comedone Extractor:   Yes\\nIntralesional Injections with Kenalog preparation:      No                                          \\nConcentration: N/A\\nTotal Volume: N/A\\nProducts Applied: Obagi Pore Therapy and Obagi NuDerm Sun Shield SPF 50\\n\\Kim indications, treatment expectations (including management of any possible irritations), protocols, risks and benefits, pre/post care are reviewed. Additional information can be found on consent documentation. Patient understands that multiple treatments may be necessary for optimum results and that on going maintenance with at-home products and additional office visits or treatments may be needed to enhance and extend the desired results.\\n\\nStandard protocol was done. Reviewed with patient that ILS injections as well as manual extractions may be recommended for optimum results. These will be considered prn.  Following treatment, the expected mild erythema was observed. Patient tolerated the procedure well without immediate bruising or complication. Post care was reviewed.\\n\\nRTC: 2-3 weeks, TheraClear # 11
Render Post-Care Instructions In Note?: no
Detail Level: Zone
Post-Care Instructions: I reviewed with the patient in detail post-care instructions. Patient is to keep the treatment areaas dry overnight, and then apply bacitracin twice daily until healed. Patient may apply hydrogen peroxide soaks to remove any crusting.
Extraction Method: lancet and extractor

## 2020-06-18 NOTE — PROCEDURE: INTRALESIONAL KENALOG
Medical Necessity Clause: This procedure was medically necessary because the lesions that were treated were:
X Size Of Lesion In Cm (Optional): 0
Kenalog Preparation: Kenalog
Administered By (Optional): Heidi
Total Volume Injected (Ccs- Only Use Numbers And Decimals): .15
Concentration Of Solution Injected (Mg/Ml): 3.0
Include Z78.9 (Other Specified Conditions Influencing Health Status) As An Associated Diagnosis?: No
Detail Level: Simple
Consent: The risks of atrophy were reviewed with the patient.

## 2020-06-18 NOTE — PROCEDURE: MIPS QUALITY
Quality 131: Pain Assessment And Follow-Up: Pain assessment using a standardized tool is documented as negative, no follow-up plan required
Quality 226: Preventive Care And Screening: Tobacco Use: Screening And Cessation Intervention: Patient screened for tobacco use and is an ex/non-smoker
Quality 431: Preventive Care And Screening: Unhealthy Alcohol Use - Screening: Patient screened for unhealthy alcohol use using a single question and scores less than 2 times per year
Quality 110: Preventive Care And Screening: Influenza Immunization: Influenza immunization was not ordered or administered, reason not given
Quality 130: Documentation Of Current Medications In The Medical Record: Current Medications Documented
Quality 400a: One-Time Screening For Hepatitis C Virus (Hcv) For All Patients: Patient received one-time screening for HCV infection
Detail Level: Detailed

## 2020-06-18 NOTE — HPI: OTHER
Condition:: Acne
Please Describe Your Condition:: Pt presents for Theraclear to bilateral axilla and bilateral inferior breast, treatment #10.  Pt denies any specific concerns today.  Pt states she was referred to a doctor at [facility], by GRANT CHOWDHURY,  and we will need to get the medical records from him.  Medications, allergies and medical history were reviewed with patient

## 2020-07-16 ENCOUNTER — APPOINTMENT (OUTPATIENT)
Dept: URBAN - METROPOLITAN AREA CLINIC 211 | Age: 55
Setting detail: DERMATOLOGY
End: 2020-07-17

## 2020-07-16 DIAGNOSIS — Z41.9 ENCOUNTER FOR PROCEDURE FOR PURPOSES OTHER THAN REMEDYING HEALTH STATE, UNSPECIFIED: ICD-10-CM

## 2020-07-16 DIAGNOSIS — L70.0 ACNE VULGARIS: ICD-10-CM

## 2020-07-16 PROCEDURE — OTHER OTHER (COSMETIC): OTHER

## 2020-07-16 PROCEDURE — OTHER MIPS QUALITY: OTHER

## 2020-07-16 PROCEDURE — OTHER ACNE SURGERY: OTHER

## 2020-07-16 PROCEDURE — 10040 ACNE SURGERY: CPT

## 2020-07-16 ASSESSMENT — LOCATION SIMPLE DESCRIPTION DERM
LOCATION SIMPLE: RIGHT CHEEK
LOCATION SIMPLE: LEFT CHEEK

## 2020-07-16 ASSESSMENT — LOCATION DETAILED DESCRIPTION DERM
LOCATION DETAILED: RIGHT INFERIOR MEDIAL MALAR CHEEK
LOCATION DETAILED: LEFT INFERIOR MEDIAL MALAR CHEEK

## 2020-07-16 ASSESSMENT — LOCATION ZONE DERM: LOCATION ZONE: FACE

## 2020-07-16 NOTE — PROCEDURE: MIPS QUALITY
Detail Level: Detailed
Quality 431: Preventive Care And Screening: Unhealthy Alcohol Use - Screening: Patient screened for unhealthy alcohol use using a single question and scores less than 2 times per year
Quality 226: Preventive Care And Screening: Tobacco Use: Screening And Cessation Intervention: Patient screened for tobacco use and is an ex/non-smoker
Quality 131: Pain Assessment And Follow-Up: Pain assessment using a standardized tool is documented as negative, no follow-up plan required
Quality 400a: One-Time Screening For Hepatitis C Virus (Hcv) For All Patients: Patient received one-time screening for HCV infection
Quality 110: Preventive Care And Screening: Influenza Immunization: Influenza immunization was not ordered or administered, reason not given
Quality 130: Documentation Of Current Medications In The Medical Record: Current Medications Documented

## 2020-07-16 NOTE — PROCEDURE: ACNE SURGERY
Acne Type: Comedonal Lesions
Detail Level: Zone
Extraction Method: lancet and extractor
Render Post-Care Instructions In Note?: no
Post-Care Instructions: I reviewed with the patient in detail post-care instructions. Patient is to keep the treatment areaas dry overnight, and then apply bacitracin twice daily until healed. Patient may apply hydrogen peroxide soaks to remove any crusting.
Prep Text (Optional): Prior to removal the treatment areas were prepped in the usual fashion.\\n\\nAcne Surgery with TheraClear\\n\\nPE: improvement of open and closed comedones, papules, pustules, acne cysts\\n\\nPrior to treatment, the procedure was reviewed in detail, including all but not limited to discomfort with manual extractions, mild erythema and edema immediately following treatment and post care. All questions were answered prior to administering the treatment.\\n\\nTreatment # 11\\nSite(s): bilateral axilla, bilateral inferior breast\\n\\nEnergy: 5\\nVac Level: 1\\nPulse Mode: single\\nPulse Width/ms: short\\nTotal Passes: 2\\n\\nAcne surgery with Lancet and Comedone Extractor:   Yes\\nIntralesional Injections with Kenalog preparation:      No                                          \\nConcentration: N/A\\nTotal Volume: N/A\\nProducts Applied: Obagi Pore Therapy and Obagi NuDerm Sun Shield SPF 50\\n\\Kim indications, treatment expectations (including management of any possible irritations), protocols, risks and benefits, pre/post care are reviewed. Additional information can be found on consent documentation. Patient understands that multiple treatments may be necessary for optimum results and that on going maintenance with at-home products and additional office visits or treatments may be needed to enhance and extend the desired results.\\n\\nStandard protocol was done. Reviewed with patient that ILS injections as well as manual extractions may be recommended for optimum results. These will be considered prn.  Following treatment, the expected mild erythema was observed. Patient tolerated the procedure well without immediate bruising or complication. Post care was reviewed.\\n\\nRTC: 2-3 weeks, treatment #12

## 2020-07-16 NOTE — HPI: OTHER
Condition:: Cosmetic
Please Describe Your Condition:: Pt presents for TheraClear to bilateral axilla and bilateral inferior breast, treatment #11.  Pt also presents for cherry berry facial.  Pt denies any specific concerns today, she is here for skin maintenance.  Medications, allergies and medical history were reviewed with patient

## 2020-07-16 NOTE — PROCEDURE: OTHER (COSMETIC)
Other (Free Text): Cosmetic: \\n\\nPlan: Cherry Berry Facial\\n\\nPE: vellus hair, skin maintenance \\n\\nPrior to treatment, all but not limited to treatment indications and expectations(including management of any possible irritations) protocols, risks and benefits were reviewed in detail, including post treatment expectations.  All questions were answered prior to administering the treatment.\\n\\nTreatment #:1/1\\nTreatment Site: Full Face\\n\\nCleanse: Areli Gentle, Areli Clarifying Polish\\n\\nAcetone Prep: Yes\\n\\nDermaplaning utilizing #10 blade & scalpel with a gently stroking motion/45 degree angle.  Followed by 35% Phyto Pumpkin Enzyme Peel neutralized in 3 minutes, removed with cool king towel and moistened sponges.  Once removed Gray Mist and L-Lactic peel is applied with a fan brush, neutralized in 3 minutes and removed w/ cool king towel and moistened sponges\\n\\nProducts Applied: IS clinical ProHeal, IS Youth eye and Youth Complex, Elta MD lip, Obagi SPF + tint\\n\\nNotes:\\n\\nPatient understands that multiple treatments may be necessary for optimum results and that on going maintenance with at-home products and additional office visits or treatments may be needed to enhance and extend the desired results.\\n\\nStandard protocol was done.  Following treatment, the expected mild erythema was observed. Patient tolerated the procedure well without immediate complication. Post care was reviewed and a follow up appointment was recommended.\\n\\Kim questions were addressed.\\n\\nRTC: 4-6 weeks Other (Free Text): Cosmetic: \\n\\nPlan: Cherry Berry Facial\\n\\nPE: vellus hair, skin maintenance \\n\\nPrior to treatment, all but not limited to treatment indications and expectations(including management of any possible irritations) protocols, risks and benefits were reviewed in detail, including post treatment expectations.  All questions were answered prior to administering the treatment.\\n\\nTreatment #:1/1\\nTreatment Site: Full Face\\n\\nCleanse: Areli Gentle, Areli Clarifying Polish\\n\\nAcetone Prep: Yes\\n\\nDermaplaning utilizing #10 blade & scalpel with a gently stroking motion/45 degree angle.  Followed by 35% Phyto Pumpkin Enzyme Peel neutralized in 3 minutes, removed with cool king towel and moistened sponges.  Once removed [place] Mist and L-Lactic peel is applied with a fan brush, neutralized in 3 minutes and removed w/ cool king towel and moistened sponges\\n\\nProducts Applied: IS clinical ProHeal, IS Youth eye and Youth Complex, Elta MD lip, Obagi SPF + tint\\n\\nNotes:\\n\\nPatient understands that multiple treatments may be necessary for optimum results and that on going maintenance with at-home products and additional office visits or treatments may be needed to enhance and extend the desired results.\\n\\nStandard protocol was done.  Following treatment, the expected mild erythema was observed. Patient tolerated the procedure well without immediate complication. Post care was reviewed and a follow up appointment was recommended.\\n\\Kim questions were addressed.\\n\\nRTC: 4-6 weeks

## 2020-08-27 ENCOUNTER — APPOINTMENT (OUTPATIENT)
Dept: URBAN - METROPOLITAN AREA CLINIC 211 | Age: 55
Setting detail: DERMATOLOGY
End: 2020-08-27

## 2020-08-27 DIAGNOSIS — L70.0 ACNE VULGARIS: ICD-10-CM

## 2020-08-27 DIAGNOSIS — Z41.9 ENCOUNTER FOR PROCEDURE FOR PURPOSES OTHER THAN REMEDYING HEALTH STATE, UNSPECIFIED: ICD-10-CM

## 2020-08-27 PROCEDURE — OTHER MIPS QUALITY: OTHER

## 2020-08-27 PROCEDURE — OTHER ACNE SURGERY: OTHER

## 2020-08-27 PROCEDURE — 10040 ACNE SURGERY: CPT

## 2020-08-27 PROCEDURE — OTHER OTHER (COSMETIC): OTHER

## 2020-08-27 ASSESSMENT — LOCATION SIMPLE DESCRIPTION DERM: LOCATION SIMPLE: LEFT CHEEK

## 2020-08-27 ASSESSMENT — LOCATION ZONE DERM: LOCATION ZONE: FACE

## 2020-08-27 ASSESSMENT — LOCATION DETAILED DESCRIPTION DERM: LOCATION DETAILED: LEFT INFERIOR MEDIAL MALAR CHEEK

## 2020-08-27 NOTE — HPI: OTHER
Condition:: Cosmetic
Please Describe Your Condition:: Pt presents for theraclear to the axilla.  Pt also presents for a cherry berry facial.  Pt denies any additional concerns.  Medications, allergies and medical hx were reviewed.

## 2020-08-27 NOTE — PROCEDURE: OTHER (COSMETIC)
Other (Free Text): Silk Peel Dermalinfusion\\n\\nPE:\\n \\nPrior to treatment, all but not limited to treatment indications and expectations(including management of any possible irritations) protocols, risks and benefits were reviewed in detail, including post treatment expectations. All questions were answered prior to administering the treatment.\\n\\nTreatment #1\\nTreatment Site:full face\\n\\nCleanse:ZO Hydrating Cleanser and ZO Exfoliating Polish and PCA Nutrient Toner\\n\\nSelected Serum:Hydrating Serum\\nGrit Size:6-80\\nPSI:2.8 -2.9\\n\\nLactic Prep was applied and neutralized with moistened sponges prior to the Phyto [place] Enzyme Mask being gently massaged on and occluded with a warm king towel.  Mask was removed within a few minutes or as tolerated.\\n\\nProducts applied:[place] Antioxidant Mist, iS Clinical HydraCool Serum, CRX Day Lotion, ZO Primer + SPF, Youth Eye Complex and Elta MD Lip Balm\\n\\nNotes:\\n\\Kim indications, treatment expectations (including management of any possible irritations), protocols, risks and benefits, pre/post care are reviewed. Additional information can be found on consent documentation. Patient understands that multiple treatments may be necessary for optimum results and that on going maintenance with at-home products and additional office visits or treatments may be needed to enhance and extend the desired results.\\n\\nStandard protocol was done.  Following treatment, the expected mild erythema was observed. Patient tolerated the procedure well without immediate complication. Post care was reviewed and a follow up appointment was recommended.\\n\\Kim questions were addressed.\\n\\nRTC:4-6 weeks for a facial Other (Free Text): Silk Peel Dermalinfusion\\n\\nPE:\\n \\nPrior to treatment, all but not limited to treatment indications and expectations(including management of any possible irritations) protocols, risks and benefits were reviewed in detail, including post treatment expectations. All questions were answered prior to administering the treatment.\\n\\nTreatment #1\\nTreatment Site:full face\\n\\nCleanse:ZO Hydrating Cleanser and ZO Exfoliating Polish and PCA Nutrient Toner\\n\\nSelected Serum:Hydrating Serum\\nGrit Size:6-80\\nPSI:2.8 -2.9\\n\\nLactic Prep was applied and neutralized with moistened sponges prior to the Phyto [place] Enzyme Mask being gently massaged on and occluded with a warm king towel.  Mask was removed within a few minutes or as tolerated.\\n\\nProducts applied:[place] Antioxidant Mist, iS Clinical HydraCool Serum, CRX Day Lotion, ZO Primer + SPF, Youth Eye Complex and Elta MD Lip Balm\\n\\nNotes:\\n\\Kim indications, treatment expectations (including management of any possible irritations), protocols, risks and benefits, pre/post care are reviewed. Additional information can be found on consent documentation. Patient understands that multiple treatments may be necessary for optimum results and that on going maintenance with at-home products and additional office visits or treatments may be needed to enhance and extend the desired results.\\n\\nStandard protocol was done.  Following treatment, the expected mild erythema was observed. Patient tolerated the procedure well without immediate complication. Post care was reviewed and a follow up appointment was recommended.\\n\\Kim questions were addressed.\\n\\nRTC:4-6 weeks for a facial

## 2020-08-27 NOTE — PROCEDURE: ACNE SURGERY
Prep Text (Optional): Prior to removal the treatment areas were prepped in the usual fashion.\\n\\nAcne Surgery with TheraClear\\n\\nPE: improvement of open and closed comedones, papules, pustules, acne cysts\\n\\nPrior to treatment, the procedure was reviewed in detail, including all but not limited to discomfort with manual extractions, mild erythema and edema immediately following treatment and post care. All questions were answered prior to administering the treatment.\\n\\nTreatment #1\\nSite(s):bilateral axilla\\n\\nEnergy:5\\nVac Level:1\\nPulse Mode:single\\nPulse Width/ms:long\\nTotal Passes:2\\n\\nAcne surgery with Lancet and Comedone Extractor:   Yes\\nIntralesional Injections with Kenalog preparation:      No                                          \\nConcentration: N/A\\nTotal Volume: N/A\\nProducts Applied: Obagi Pore Therapy and Obagi NuDerm Sun Shield SPF 50\\n\\Kim indications, treatment expectations (including management of any possible irritations), protocols, risks and benefits, pre/post care are reviewed. Additional information can be found on consent documentation. Patient understands that multiple treatments may be necessary for optimum results and that on going maintenance with at-home products and additional office visits or treatments may be needed to enhance and extend the desired results.\\n\\nStandard protocol was done. Reviewed with patient that ILS injections as well as manual extractions may be recommended for optimum results. These will be considered prn.  Following treatment, the expected mild erythema was observed. Patient tolerated the procedure well without immediate bruising or complication. Post care was reviewed.\\n\\nRTC:4-6 week theraclear tx
Render Number Of Lesions Treated: no
Post-Care Instructions: I reviewed with the patient in detail post-care instructions. Patient is to keep the treatment areaas dry overnight, and then apply bacitracin twice daily until healed. Patient may apply hydrogen peroxide soaks to remove any crusting.
Extraction Method: lancet and extractor
Detail Level: Zone
Acne Type: Comedonal Lesions

## 2020-08-27 NOTE — PROCEDURE: MIPS QUALITY
Quality 131: Pain Assessment And Follow-Up: Pain assessment using a standardized tool is documented as negative, no follow-up plan required
Quality 400a: One-Time Screening For Hepatitis C Virus (Hcv) For All Patients: Patient received one-time screening for HCV infection
Quality 110: Preventive Care And Screening: Influenza Immunization: Influenza immunization was not ordered or administered, reason not given
Quality 130: Documentation Of Current Medications In The Medical Record: Current Medications Documented
Quality 431: Preventive Care And Screening: Unhealthy Alcohol Use - Screening: Patient screened for unhealthy alcohol use using a single question and scores less than 2 times per year
Quality 226: Preventive Care And Screening: Tobacco Use: Screening And Cessation Intervention: Patient screened for tobacco use and is an ex/non-smoker
Detail Level: Detailed

## 2021-01-22 ENCOUNTER — APPOINTMENT (OUTPATIENT)
Dept: URBAN - METROPOLITAN AREA CLINIC 211 | Age: 56
Setting detail: DERMATOLOGY
End: 2021-01-22

## 2021-01-22 DIAGNOSIS — L70.0 ACNE VULGARIS: ICD-10-CM

## 2021-01-22 PROCEDURE — OTHER ACNE SURGERY: OTHER

## 2021-01-22 PROCEDURE — 10040 ACNE SURGERY: CPT

## 2021-01-22 PROCEDURE — OTHER MIPS QUALITY: OTHER

## 2021-01-22 ASSESSMENT — LOCATION SIMPLE DESCRIPTION DERM: LOCATION SIMPLE: LEFT CHEEK

## 2021-01-22 ASSESSMENT — LOCATION ZONE DERM: LOCATION ZONE: FACE

## 2021-01-22 ASSESSMENT — LOCATION DETAILED DESCRIPTION DERM: LOCATION DETAILED: LEFT INFERIOR MEDIAL MALAR CHEEK

## 2021-01-22 NOTE — HPI: OTHER
Condition:: Acne
Please Describe Your Condition:: Pt presents for theraclear to the bilateral axilla and under the breast.  Pt has been to [place] and seen a specialist which put her on a treatment regimen which seem s to be working on the HS.  Pt has asked help in getting her records transferred here for the ongoing follow ups.  Medications, allergies and medical hx were reviewed.

## 2021-01-22 NOTE — PROCEDURE: MIPS QUALITY
Quality 110: Preventive Care And Screening: Influenza Immunization: Influenza immunization was not ordered or administered, reason not given
Detail Level: Detailed
Quality 226: Preventive Care And Screening: Tobacco Use: Screening And Cessation Intervention: Patient screened for tobacco use and is an ex/non-smoker
Quality 400a: One-Time Screening For Hepatitis C Virus (Hcv) For All Patients: Patient received one-time screening for HCV infection
Quality 130: Documentation Of Current Medications In The Medical Record: Current Medications Documented
Quality 431: Preventive Care And Screening: Unhealthy Alcohol Use - Screening: Patient screened for unhealthy alcohol use using a single question and scores less than 2 times per year
Quality 131: Pain Assessment And Follow-Up: Pain assessment using a standardized tool is documented as negative, no follow-up plan required

## 2021-01-22 NOTE — PROCEDURE: ACNE SURGERY
Acne Type: Comedonal Lesions
Detail Level: Zone
Prep Text (Optional): Prior to removal the treatment areas were prepped in the usual fashion.\\n\\nAcne Surgery with TheraClear\\n\\nPE: improvement of open and closed comedones, papules, pustules, acne cysts\\n\\nPrior to treatment, the procedure was reviewed in detail, including all but not limited to discomfort with manual extractions, mild erythema and edema immediately following treatment and post care. All questions were answered prior to administering the treatment.\\n\\nTreatment #1\\nSite(s):axilla and bilateral underneath breasts\\n\\nEnergy:6\\nVac Level:1\\nPulse Mode:single\\nPulse Width/ms:long\\nTotal Passes:2\\n\\nAcne surgery with Lancet and Comedone Extractor:   Yes\\nIntralesional Injections with Kenalog preparation:      No                                          \\nConcentration: N/A\\nTotal Volume: N/A\\nProducts Applied: Obagi Pore Therapy and Obagi NuDerm Sun Shield SPF 50\\n\\Kim indications, treatment expectations (including management of any possible irritations), protocols, risks and benefits, pre/post care are reviewed. Additional information can be found on consent documentation. Patient understands that multiple treatments may be necessary for optimum results and that on going maintenance with at-home products and additional office visits or treatments may be needed to enhance and extend the desired results.\\n\\nStandard protocol was done. Reviewed with patient that ILS injections as well as manual extractions may be recommended for optimum results. These will be considered prn.  Following treatment, the expected mild erythema was observed. Patient tolerated the procedure well without immediate bruising or complication. Post care was reviewed.\\n\\nRTC:3-4 week theraclear
Render Number Of Lesions Treated: no
Post-Care Instructions: I reviewed with the patient in detail post-care instructions. Patient is to keep the treatment areaas dry overnight, and then apply bacitracin twice daily until healed. Patient may apply hydrogen peroxide soaks to remove any crusting.
Extraction Method: lancet and extractor

## 2021-02-11 ENCOUNTER — APPOINTMENT (OUTPATIENT)
Dept: URBAN - METROPOLITAN AREA CLINIC 211 | Age: 56
Setting detail: DERMATOLOGY
End: 2021-02-11

## 2021-02-11 DIAGNOSIS — L70.0 ACNE VULGARIS: ICD-10-CM

## 2021-02-11 DIAGNOSIS — Z41.9 ENCOUNTER FOR PROCEDURE FOR PURPOSES OTHER THAN REMEDYING HEALTH STATE, UNSPECIFIED: ICD-10-CM

## 2021-02-11 PROCEDURE — OTHER MIPS QUALITY: OTHER

## 2021-02-11 PROCEDURE — 10040 ACNE SURGERY: CPT

## 2021-02-11 PROCEDURE — OTHER ACNE SURGERY: OTHER

## 2021-02-11 PROCEDURE — OTHER OTHER (COSMETIC): OTHER

## 2021-02-11 ASSESSMENT — LOCATION DETAILED DESCRIPTION DERM: LOCATION DETAILED: LEFT INFERIOR MEDIAL MALAR CHEEK

## 2021-02-11 ASSESSMENT — LOCATION SIMPLE DESCRIPTION DERM: LOCATION SIMPLE: LEFT CHEEK

## 2021-02-11 ASSESSMENT — LOCATION ZONE DERM: LOCATION ZONE: FACE

## 2021-02-11 NOTE — HPI: OTHER
Condition:: Acne and Cosmetic
Please Describe Your Condition:: Pt presents for the Theraclear to the axilla Tx #1/1.  Pt a;so presents for the Briseida Glow Facial #1/1 to full face.  Medications, allergies and medical hx were reviewed.

## 2021-02-11 NOTE — PROCEDURE: ACNE SURGERY
Prep Text (Optional): Prior to removal the treatment areas were prepped in the usual fashion.\\n\\nAcne Surgery with TheraClear\\n\\nPE: 50% decreased open comedones\\n\\nPrior to treatment, the procedure was reviewed in detail, including all but not limited to discomfort with manual extractions, mild erythema and edema immediately following treatment and post care. All questions were answered prior to administering the treatment.\\n\\nTreatment #1/1\\nSite(s):axilla\\n\\nEnergy:6\\nVac Level:1\\nPulse Mode:single\\nPulse Width/ms:short\\nTotal Passes:3 passes with 4th to select sites\\n\\nAcne surgery with Lancet and Comedone Extractor:   Yes\\nIntralesional Injections with Kenalog preparation:      No                                          \\nConcentration: N/A\\nTotal Volume: N/A\\nProducts Applied: Obagi Pore Therapy and Obagi NuDerm Sun Shield SPF 50\\n\\nNote:\\n\\n1.  Reviewed with pt that her records were received from [place] and encouraged to set a f/u appointment with LV[facility] to prepare for what will happen going forward.  Rationale was provided.\\n\\Kim indications, treatment expectations (including management of any possible irritations), protocols, risks and benefits, pre/post care are reviewed. Additional information can be found on consent documentation. Patient understands that multiple treatments may be necessary for optimum results and that on going maintenance with at-home products and additional office visits or treatments may be needed to enhance and extend the desired results.\\n\\nStandard protocol was done. Reviewed with patient that ILS injections as well as manual extractions may be recommended for optimum results. These will be considered prn.  Following treatment, the expected mild erythema was observed. Patient tolerated the procedure well without immediate bruising or complication. Post care was reviewed.\\n\\nRTC:3-4 weeks theraclear Prep Text (Optional): Prior to removal the treatment areas were prepped in the usual fashion.\\n\\nAcne Surgery with TheraClear\\n\\nPE: 50% decreased open comedones\\n\\nPrior to treatment, the procedure was reviewed in detail, including all but not limited to discomfort with manual extractions, mild erythema and edema immediately following treatment and post care. All questions were answered prior to administering the treatment.\\n\\nTreatment #1/1\\nSite(s):axilla\\n\\nEnergy:6\\nVac Level:1\\nPulse Mode:single\\nPulse Width/ms:short\\nTotal Passes:3 passes with 4th to select sites\\n\\nAcne surgery with Lancet and Comedone Extractor:   Yes\\nIntralesional Injections with Kenalog preparation:      No                                          \\nConcentration: N/A\\nTotal Volume: N/A\\nProducts Applied: Obagi Pore Therapy and Obagi NuDerm Sun Shield SPF 50\\n\\nNote:\\n\\n1.  Reviewed with pt that her records were received from [place] and encouraged to set a f/u appointment with LVJ to prepare for what will happen going forward.  Rationale was provided.\\n\\Kim indications, treatment expectations (including management of any possible irritations), protocols, risks and benefits, pre/post care are reviewed. Additional information can be found on consent documentation. Patient understands that multiple treatments may be necessary for optimum results and that on going maintenance with at-home products and additional office visits or treatments may be needed to enhance and extend the desired results.\\n\\nStandard protocol was done. Reviewed with patient that ILS injections as well as manual extractions may be recommended for optimum results. These will be considered prn.  Following treatment, the expected mild erythema was observed. Patient tolerated the procedure well without immediate bruising or complication. Post care was reviewed.\\n\\nRTC:3-4 weeks theraclear

## 2021-02-11 NOTE — PROCEDURE: MIPS QUALITY
Quality 431: Preventive Care And Screening: Unhealthy Alcohol Use - Screening: Patient screened for unhealthy alcohol use using a single question and scores less than 2 times per year
Quality 400a: One-Time Screening For Hepatitis C Virus (Hcv) For All Patients: Patient received one-time screening for HCV infection
Quality 130: Documentation Of Current Medications In The Medical Record: Current Medications Documented
Quality 110: Preventive Care And Screening: Influenza Immunization: Influenza immunization was not ordered or administered, reason not given
Detail Level: Detailed
Quality 131: Pain Assessment And Follow-Up: Pain assessment using a standardized tool is documented as negative, no follow-up plan required
Quality 226: Preventive Care And Screening: Tobacco Use: Screening And Cessation Intervention: Patient screened for tobacco use and is an ex/non-smoker

## 2021-03-19 ENCOUNTER — APPOINTMENT (OUTPATIENT)
Dept: URBAN - METROPOLITAN AREA CLINIC 211 | Age: 56
Setting detail: DERMATOLOGY
End: 2021-03-19

## 2021-03-19 DIAGNOSIS — L72.8 OTHER FOLLICULAR CYSTS OF THE SKIN AND SUBCUTANEOUS TISSUE: ICD-10-CM

## 2021-03-19 DIAGNOSIS — L70.0 ACNE VULGARIS: ICD-10-CM

## 2021-03-19 PROCEDURE — 11900 INJECT SKIN LESIONS </W 7: CPT

## 2021-03-19 PROCEDURE — 10040 ACNE SURGERY: CPT

## 2021-03-19 PROCEDURE — OTHER MIPS QUALITY: OTHER

## 2021-03-19 PROCEDURE — OTHER INTRALESIONAL KENALOG: OTHER

## 2021-03-19 PROCEDURE — OTHER ACNE SURGERY: OTHER

## 2021-03-19 ASSESSMENT — LOCATION ZONE DERM
LOCATION ZONE: TRUNK
LOCATION ZONE: FACE

## 2021-03-19 ASSESSMENT — LOCATION SIMPLE DESCRIPTION DERM
LOCATION SIMPLE: LEFT CHEEK
LOCATION SIMPLE: ABDOMEN

## 2021-03-19 ASSESSMENT — LOCATION DETAILED DESCRIPTION DERM
LOCATION DETAILED: RIGHT INFERIOR FLANK
LOCATION DETAILED: LEFT INFERIOR MEDIAL MALAR CHEEK

## 2021-03-19 NOTE — HPI: OTHER
Condition:: Cosmetic
Please Describe Your Condition:: is being seen for a chief complaint of Cosmetic . Acne surgery\\nMedications and medical hx reviewed

## 2021-03-19 NOTE — PROCEDURE: INTRALESIONAL KENALOG
Administered By (Optional): Heidi
Total Volume Injected (Ccs- Only Use Numbers And Decimals): 0.15
Detail Level: Simple
X Size Of Lesion In Cm (Optional): 0
Concentration Of Solution Injected (Mg/Ml): 3.0
Include Z78.9 (Other Specified Conditions Influencing Health Status) As An Associated Diagnosis?: No
Kenalog Preparation: Kenalog
Medical Necessity Clause: This procedure was medically necessary because the lesions that were treated were:
Consent: The risks of atrophy were reviewed with the patient.

## 2021-03-19 NOTE — PROCEDURE: ACNE SURGERY
Prep Text (Optional): Prior to removal the treatment areas were prepped in the usual fashion.\\n\\nAcne Surgery with TheraClear\\n\\nPE: 50% decreased open comedones\\n\\nPrior to treatment, the procedure was reviewed in detail, including all but not limited to discomfort with manual extractions, mild erythema and edema immediately following treatment and post care. All questions were answered prior to administering the treatment.\\n\\nTreatment #1/1\\nSite(s):axilla\\n\\nEnergy:6\\nVac Level:1\\nPulse Mode: single\\nPulse Width/ms:short\\nTotal Passes: 2 passes with 3rd to select sites\\n\\nAcne surgery with Lancet and Comedone Extractor:   Yes\\nIntralesional Injections with Kenalog preparation:      Yes                                          \\nConcentration: TAC 3\\nTotal Volume: 0.15\\nProducts Applied: Obagi Pore Therapy and Obagi NuDerm Sun Shield SPF 50\\n\\nNote:\\n\\n1.  Reviewed with pt that her records were received from [place] and encouraged to set a f/u appointment with [facility] to prepare for what will happen going forward.  Rationale was provided.\\n\\Kim indications, treatment expectations (including management of any possible irritations), protocols, risks and benefits, pre/post care are reviewed. Additional information can be found on consent documentation. Patient understands that multiple treatments may be necessary for optimum results and that on going maintenance with at-home products and additional office visits or treatments may be needed to enhance and extend the desired results.\\n\\nStandard protocol was done. Reviewed with patient that ILS injections as well as manual extractions may be recommended for optimum results. These will be considered prn.  Following treatment, the expected mild erythema was observed. Patient tolerated the procedure well without immediate bruising or complication. Post care was reviewed.\\n\\nRTC:3-4 weeks theraclear Prep Text (Optional): Prior to removal the treatment areas were prepped in the usual fashion.\\n\\nAcne Surgery with TheraClear\\n\\nPE: 50% decreased open comedones\\n\\nPrior to treatment, the procedure was reviewed in detail, including all but not limited to discomfort with manual extractions, mild erythema and edema immediately following treatment and post care. All questions were answered prior to administering the treatment.\\n\\nTreatment #1/1\\nSite(s):axilla\\n\\nEnergy:6\\nVac Level:1\\nPulse Mode: single\\nPulse Width/ms:short\\nTotal Passes: 2 passes with 3rd to select sites\\n\\nAcne surgery with Lancet and Comedone Extractor:   Yes\\nIntralesional Injections with Kenalog preparation:      Yes                                          \\nConcentration: TAC 3\\nTotal Volume: 0.15\\nProducts Applied: Obagi Pore Therapy and Obagi NuDerm Sun Shield SPF 50\\n\\nNote:\\n\\n1.  Reviewed with pt that her records were received from [place] and encouraged to set a f/u appointment with [facility] to prepare for what will happen going forward.  Rationale was provided.\\n\\Kim indications, treatment expectations (including management of any possible irritations), protocols, risks and benefits, pre/post care are reviewed. Additional information can be found on consent documentation. Patient understands that multiple treatments may be necessary for optimum results and that on going maintenance with at-home products and additional office visits or treatments may be needed to enhance and extend the desired results.\\n\\nStandard protocol was done. Reviewed with patient that ILS injections as well as manual extractions may be recommended for optimum results. These will be considered prn.  Following treatment, the expected mild erythema was observed. Patient tolerated the procedure well without immediate bruising or complication. Post care was reviewed.\\n\\nRTC:3-4 weeks theraclear

## 2021-04-15 ENCOUNTER — APPOINTMENT (OUTPATIENT)
Dept: URBAN - METROPOLITAN AREA CLINIC 211 | Age: 56
Setting detail: DERMATOLOGY
End: 2021-04-20

## 2021-04-15 ENCOUNTER — APPOINTMENT (OUTPATIENT)
Dept: URBAN - METROPOLITAN AREA CLINIC 211 | Age: 56
Setting detail: DERMATOLOGY
End: 2021-04-16

## 2021-04-15 DIAGNOSIS — L70.0 ACNE VULGARIS: ICD-10-CM

## 2021-04-15 DIAGNOSIS — L82.0 INFLAMED SEBORRHEIC KERATOSIS: ICD-10-CM

## 2021-04-15 PROCEDURE — OTHER ACNE SURGERY: OTHER

## 2021-04-15 PROCEDURE — 17111 DESTRUCT LESION 15 OR MORE: CPT

## 2021-04-15 PROCEDURE — 10040 ACNE SURGERY: CPT

## 2021-04-15 PROCEDURE — OTHER MIPS QUALITY: OTHER

## 2021-04-15 PROCEDURE — OTHER COUNSELING: OTHER

## 2021-04-15 PROCEDURE — OTHER LIQUID NITROGEN: OTHER

## 2021-04-15 ASSESSMENT — LOCATION SIMPLE DESCRIPTION DERM
LOCATION SIMPLE: CHEST
LOCATION SIMPLE: RIGHT CHEEK
LOCATION SIMPLE: LEFT CHEEK
LOCATION SIMPLE: LEFT CHEEK
LOCATION SIMPLE: RIGHT ANTERIOR NECK
LOCATION SIMPLE: LEFT UPPER ARM
LOCATION SIMPLE: LEFT FOREHEAD
LOCATION SIMPLE: RIGHT FOREHEAD

## 2021-04-15 ASSESSMENT — LOCATION DETAILED DESCRIPTION DERM
LOCATION DETAILED: RIGHT LATERAL SUPERIOR CHEST
LOCATION DETAILED: LEFT INFERIOR MEDIAL MALAR CHEEK
LOCATION DETAILED: LEFT LATERAL FOREHEAD
LOCATION DETAILED: RIGHT SUPERIOR LATERAL MALAR CHEEK
LOCATION DETAILED: LEFT FOREHEAD
LOCATION DETAILED: LEFT ANTERIOR DISTAL UPPER ARM
LOCATION DETAILED: LEFT CENTRAL MALAR CHEEK
LOCATION DETAILED: RIGHT MEDIAL SUPERIOR CHEST
LOCATION DETAILED: RIGHT CENTRAL MALAR CHEEK
LOCATION DETAILED: RIGHT INFERIOR LATERAL MALAR CHEEK
LOCATION DETAILED: LEFT LATERAL SUPERIOR CHEST
LOCATION DETAILED: RIGHT LATERAL FOREHEAD
LOCATION DETAILED: RIGHT INFERIOR LATERAL NECK
LOCATION DETAILED: RIGHT LATERAL MALAR CHEEK
LOCATION DETAILED: MIDDLE STERNUM

## 2021-04-15 ASSESSMENT — LOCATION ZONE DERM
LOCATION ZONE: FACE
LOCATION ZONE: NECK
LOCATION ZONE: TRUNK
LOCATION ZONE: ARM
LOCATION ZONE: FACE

## 2021-04-15 NOTE — HPI: OTHER
Condition:: Cosmetic
Please Describe Your Condition:: Patient presents in the office for acne surgery treatment #1. Patient states she doesn’t have any concerns. All medications, allergies, and medical history were reviewed.

## 2021-04-15 NOTE — PROCEDURE: LIQUID NITROGEN
Include Z78.9 (Other Specified Conditions Influencing Health Status) As An Associated Diagnosis?: No
Post-Care Instructions: I reviewed with the patient in detail post-care instructions. Patient is to wear sunprotection, and avoid picking at any of the treated lesions. Pt may apply Vaseline to crusted or scabbing areas.
Medical Necessity Information: It is in your best interest to select a reason for this procedure from the list below. All of these items fulfill various CMS LCD requirements except the new and changing color options.
Duration Of Freeze Thaw-Cycle (Seconds): 5-10
Detail Level: Detailed
Consent: The patient's consent was obtained including but not limited to risks of crusting, scabbing, blistering, scarring, darker or lighter pigmentary change, recurrence, incomplete removal and infection.
Medical Necessity Clause: This procedure was medically necessary because the lesions that were treated were:
Number Of Freeze-Thaw Cycles: 3 freeze-thaw cycles

## 2021-04-15 NOTE — PROCEDURE: MIPS QUALITY
Quality 110: Preventive Care And Screening: Influenza Immunization: Influenza immunization was not ordered or administered, reason not given
Quality 131: Pain Assessment And Follow-Up: Pain assessment using a standardized tool is documented as negative, no follow-up plan required
Detail Level: Detailed
Quality 431: Preventive Care And Screening: Unhealthy Alcohol Use - Screening: Patient screened for unhealthy alcohol use using a single question and scores less than 2 times per year
Quality 226: Preventive Care And Screening: Tobacco Use: Screening And Cessation Intervention: Patient screened for tobacco use and is an ex/non-smoker
Quality 130: Documentation Of Current Medications In The Medical Record: Current Medications Documented

## 2021-04-15 NOTE — PROCEDURE: ACNE SURGERY
Extraction Method: lancet and extractor
Render Number Of Lesions Treated: no
Post-Care Instructions: I reviewed with the patient in detail post-care instructions. Patient is to keep the treatment areaas dry overnight, and then apply bacitracin twice daily until healed. Patient may apply hydrogen peroxide soaks to remove any crusting.
Acne Type: Comedonal Lesions
Detail Level: Zone
Prep Text (Optional): Prior to removal the treatment areas were prepped in the usual fashion.\\n\\nAcne Surgery with TheraClear\\n\\nPE: 50% decreased open comedones\\n\\nPrior to treatment, the procedure was reviewed in detail, including all but not limited to discomfort with manual extractions, mild erythema and edema immediately following treatment and post care. All questions were answered prior to administering the treatment.\\n\\nTreatment #1/1\\nSite(s):axilla\\n\\nEnergy:6\\nVac Level:1\\nPulse Mode:single\\nPulse Width/ms:short\\nTotal Passes: 2 passes\\n\\nAcne surgery with Lancet and Comedone Extractor:   Yes\\nIntralesional Injections with Kenalog preparation:      No                                          \\nConcentration: N/A\\nTotal Volume: N/A\\nProducts Applied: Obagi Pore Therapy and Obagi NuDerm Sun Shield SPF 50\\n\\nNote:\\n\\n\\n\\Kim indications, treatment expectations (including management of any possible irritations), protocols, risks and benefits, pre/post care are reviewed. Additional information can be found on consent documentation. Patient understands that multiple treatments may be necessary for optimum results and that on going maintenance with at-home products and additional office visits or treatments may be needed to enhance and extend the desired results.\\n\\nStandard protocol was done. Reviewed with patient that ILS injections as well as manual extractions may be recommended for optimum results. These will be considered prn.  Following treatment, the expected mild erythema was observed. Patient tolerated the procedure well without immediate bruising or complication. Post care was reviewed.\\n\\nRTC:3-4 weeks theraclear

## 2021-04-15 NOTE — HPI: OTHER
Condition:: Cosmetic Visit
Please Describe Your Condition:: Patient presents today to be established for cosmetic visit with [facility].  Patient states pain level of 0/10

## 2021-05-27 ENCOUNTER — APPOINTMENT (OUTPATIENT)
Dept: URBAN - METROPOLITAN AREA CLINIC 211 | Age: 56
Setting detail: DERMATOLOGY
End: 2021-05-28

## 2021-05-27 DIAGNOSIS — L70.0 ACNE VULGARIS: ICD-10-CM

## 2021-05-27 DIAGNOSIS — Z41.9 ENCOUNTER FOR PROCEDURE FOR PURPOSES OTHER THAN REMEDYING HEALTH STATE, UNSPECIFIED: ICD-10-CM

## 2021-05-27 PROCEDURE — OTHER MIPS QUALITY: OTHER

## 2021-05-27 PROCEDURE — OTHER OTHER (COSMETIC): OTHER

## 2021-05-27 PROCEDURE — OTHER ACNE SURGERY: OTHER

## 2021-05-27 PROCEDURE — 10040 ACNE SURGERY: CPT

## 2021-05-27 ASSESSMENT — LOCATION DETAILED DESCRIPTION DERM: LOCATION DETAILED: LEFT INFERIOR MEDIAL MALAR CHEEK

## 2021-05-27 ASSESSMENT — LOCATION ZONE DERM: LOCATION ZONE: FACE

## 2021-05-27 ASSESSMENT — LOCATION SIMPLE DESCRIPTION DERM: LOCATION SIMPLE: LEFT CHEEK

## 2021-05-27 NOTE — HPI: OTHER
Condition:: Acne and Cosmetic
Please Describe Your Condition:: Pt presetns for Theraclear to the axilla and a jake glow facial.  Pt denies any specific concerns.  Medications, allergies and medical hx were reviewed.

## 2021-05-27 NOTE — PROCEDURE: ACNE SURGERY
Prep Text (Optional): Prior to removal the treatment areas were prepped in the usual fashion.\\n\\nAcne Surgery with TheraClear\\n\\nPE: less significant, improvement of open comedones\\n\\nPrior to treatment, the procedure was reviewed in detail, including all but not limited to discomfort with manual extractions, mild erythema and edema immediately following treatment and post care. All questions were answered prior to administering the treatment.\\n\\nTreatment #1/1\\nSite(s):axilla and bilateral inferior breasts\\n\\nEnergy:7\\nVac Level:1\\nPulse Mode:single\\nPulse Width/ms:short ms\\nTotal Passes:2\\n\\nAcne surgery with Lancet and Comedone Extractor:   Yes\\nIntralesional Injections with Kenalog preparation:      No                                          \\nConcentration: N/A\\nTotal Volume: N/A\\nProducts Applied: Obagi Pore Therapy\\n\\Kim indications, treatment expectations (including management of any possible irritations), protocols, risks and benefits, pre/post care are reviewed. Additional information can be found on consent documentation. Patient understands that multiple treatments may be necessary for optimum results and that on going maintenance with at-home products and additional office visits or treatments may be needed to enhance and extend the desired results.\\n\\nStandard protocol was done. Reviewed with patient that ILS injections as well as manual extractions may be recommended for optimum results. These will be considered prn.  Following treatment, the expected mild erythema was observed. Patient tolerated the procedure well without immediate bruising or complication. Post care was reviewed.\\n\\nRTC:2-3 weeks Theraclear
Render Post-Care Instructions In Note?: no
Detail Level: Zone
Extraction Method: lancet and extractor
Post-Care Instructions: I reviewed with the patient in detail post-care instructions. Patient is to keep the treatment areaas dry overnight, and then apply bacitracin twice daily until healed. Patient may apply hydrogen peroxide soaks to remove any crusting.
Acne Type: Comedonal Lesions

## 2021-05-27 NOTE — PROCEDURE: OTHER (COSMETIC)
Other (Free Text): Briseida Glow Facial\\n\\nPE:\\n \\nPrior to treatment, all but not limited to treatment indications and expectations(including management of any possible irritations) protocols, risks and benefits were reviewed in detail, including post treatment expectations. All questions were answered prior to administering the treatment.\\n\\nTreatment #1/1\\nTreatment Site:full face\\n\\nCleanse:[place] Gentle Cleanser \\n\\nSelected Serum:Hydrating Serum\\nGrit Size:9-60\\nPSI:2.5-2.7\\n\\nFollowed by ZO Enzymatic  followed by the the Cherry Berry Mask was gently massaged on and occluded with a warm king towel.  Mask was removed within a few minutes or as tolerated.\\n\\nProducts applied:[place] Antioxidant Mist, ZO Daily Power Defense, ZO Primer + SPF, iS Clinical Lipshield, Neocutis Eye Creme\\n\\nNotes:\\n\\Kim indications, treatment expectations (including management of any possible irritations), protocols, risks and benefits, pre/post care are reviewed. Additional information can be found on consent documentation. Patient understands that multiple treatments may be necessary for optimum results and that on going maintenance with at-home products and additional office visits or treatments may be needed to enhance and extend the desired results.\\n\\nStandard protocol was done.  Following treatment, the expected mild erythema was observed. Patient tolerated the procedure well without immediate complication. Post care was reviewed and a follow up appointment was recommended.\\n\\Kim questions were addressed.\\n\\nRTC:4-6 week Briseida Glow Facial Other (Free Text): Briseida Glow Facial\\n\\nPE:\\n \\nPrior to treatment, all but not limited to treatment indications and expectations(including management of any possible irritations) protocols, risks and benefits were reviewed in detail, including post treatment expectations. All questions were answered prior to administering the treatment.\\n\\nTreatment #1/1\\nTreatment Site:full face\\n\\nCleanse:[place] Gentle Cleanser \\n\\nSelected Serum:Hydrating Serum\\nGrit Size:9-60\\nPSI:2.5-2.7\\n\\nFollowed by ZO Enzymatic  followed by the the Cherry Berry Mask was gently massaged on and occluded with a warm king towel.  Mask was removed within a few minutes or as tolerated.\\n\\nProducts applied:[place] Antioxidant Mist, ZO Daily Power Defense, ZO Primer + SPF, iS Clinical Lipshield, Neocutis Eye Creme\\n\\nNotes:\\n\\Kim indications, treatment expectations (including management of any possible irritations), protocols, risks and benefits, pre/post care are reviewed. Additional information can be found on consent documentation. Patient understands that multiple treatments may be necessary for optimum results and that on going maintenance with at-home products and additional office visits or treatments may be needed to enhance and extend the desired results.\\n\\nStandard protocol was done.  Following treatment, the expected mild erythema was observed. Patient tolerated the procedure well without immediate complication. Post care was reviewed and a follow up appointment was recommended.\\n\\Kim questions were addressed.\\n\\nRTC:4-6 week Briseida Glow Facial

## 2021-07-19 ENCOUNTER — APPOINTMENT (OUTPATIENT)
Dept: URBAN - METROPOLITAN AREA CLINIC 211 | Age: 56
Setting detail: DERMATOLOGY
End: 2021-07-19

## 2021-07-19 DIAGNOSIS — Z41.9 ENCOUNTER FOR PROCEDURE FOR PURPOSES OTHER THAN REMEDYING HEALTH STATE, UNSPECIFIED: ICD-10-CM

## 2021-07-19 DIAGNOSIS — L70.0 ACNE VULGARIS: ICD-10-CM

## 2021-07-19 PROCEDURE — OTHER MIPS QUALITY: OTHER

## 2021-07-19 PROCEDURE — OTHER OTHER (COSMETIC): OTHER

## 2021-07-19 PROCEDURE — OTHER ACNE SURGERY: OTHER

## 2021-07-19 PROCEDURE — 10040 ACNE SURGERY: CPT

## 2021-07-19 ASSESSMENT — LOCATION SIMPLE DESCRIPTION DERM: LOCATION SIMPLE: LEFT CHEEK

## 2021-07-19 ASSESSMENT — LOCATION DETAILED DESCRIPTION DERM: LOCATION DETAILED: LEFT INFERIOR MEDIAL MALAR CHEEK

## 2021-07-19 ASSESSMENT — LOCATION ZONE DERM: LOCATION ZONE: FACE

## 2021-07-19 NOTE — PROCEDURE: ACNE SURGERY
Render Post-Care Instructions In Note?: no
Acne Type: Comedonal Lesions
Detail Level: Zone
Prep Text (Optional): Prior to removal the treatment areas were prepped in the usual fashion.\\n\\nAcne Surgery with TheraClear\\n\\nPE: improvement of open and closed comedones, papules, pustules, acne cysts\\n\\nPrior to treatment, the procedure was reviewed in detail, including all but not limited to discomfort with manual extractions, mild erythema and edema immediately following treatment and post care. All questions were answered prior to administering the treatment.\\n\\nTreatment #1/1\\nSite(s):axilla and bilateral inferior breast\\n\\nEnergy:7\\nVac Level:1\\nPulse Mode:single\\nPulse Width/ms:short\\nTotal Passes:3\\n\\nAcne surgery with Lancet and Comedone Extractor:   Yes\\nIntralesional Injections with Kenalog preparation:      No                                          \\nConcentration: N/A\\nTotal Volume: N/A\\nProducts Applied: Obagi Pore Therapy\\n\\Kim indications, treatment expectations (including management of any possible irritations), protocols, risks and benefits, pre/post care are reviewed. Additional information can be found on consent documentation. Patient understands that multiple treatments may be necessary for optimum results and that on going maintenance with at-home products and additional office visits or treatments may be needed to enhance and extend the desired results.\\n\\nStandard protocol was done. Reviewed with patient that ILS injections as well as manual extractions may be recommended for optimum results. These will be considered prn.  Following treatment, the expected mild erythema was observed. Patient tolerated the procedure well without immediate bruising or complication. Post care was reviewed.\\n\\nRTC:4 weeks theraclear
Post-Care Instructions: I reviewed with the patient in detail post-care instructions. Patient is to keep the treatment areaas dry overnight, and then apply bacitracin twice daily until healed. Patient may apply hydrogen peroxide soaks to remove any crusting.
Extraction Method: lancet and extractor

## 2021-07-19 NOTE — PROCEDURE: MIPS QUALITY
Quality 110: Preventive Care And Screening: Influenza Immunization: Influenza immunization was not ordered or administered, reason not given
Quality 131: Pain Assessment And Follow-Up: Pain assessment using a standardized tool is documented as negative, no follow-up plan required
Quality 226: Preventive Care And Screening: Tobacco Use: Screening And Cessation Intervention: Patient screened for tobacco use and is an ex/non-smoker
Quality 130: Documentation Of Current Medications In The Medical Record: Current Medications Documented
Detail Level: Detailed
Quality 431: Preventive Care And Screening: Unhealthy Alcohol Use - Screening: Patient screened for unhealthy alcohol use using a single question and scores less than 2 times per year

## 2021-07-19 NOTE — PROCEDURE: OTHER (COSMETIC)
Other (Free Text): Briseida Glow Facial\\n\\nPE:\\n \\nPrior to treatment, all but not limited to treatment indications and expectations(including management of any possible irritations) protocols, risks and benefits were reviewed in detail, including post treatment expectations. All questions were answered prior to administering the treatment.\\n\\nTreatment #1/1\\nTreatment Site: Full face\\n\\nCleanse: [place] Gentle Cleanser and ZO Exfoliating Polish\\n\\nSelected Serum:Ultra Hydrating Serum\\nGrit Size:6-120\\nPSI:2.5 - 2.7\\n\\nFollowed by the ZO Enzymatic Peel and the Cherry Berry Mask was gently massaged on and occluded with a warm king towel.  Mask was removed within a few minutes or as tolerated.\\n\\nProducts applied:[place] Antioxidant Mist, iS Clinical Active Serum, LifeLine Daily Defense, ZO Primer + SPF, Neocutis Eye Firming and iS Clinical Lip Protector\\n\\nNotes:\\n\\Kim indications, treatment expectations (including management of any possible irritations), protocols, risks and benefits, pre/post care are reviewed. Additional information can be found on consent documentation. Patient understands that multiple treatments may be necessary for optimum results and that on going maintenance with at-home products and additional office visits or treatments may be needed to enhance and extend the desired results.\\n\\nStandard protocol was done.  Following treatment, the expected mild erythema was observed. Patient tolerated the procedure well without immediate complication. Post care was reviewed and a follow up appointment was recommended.\\n\\Kim questions were addressed.\\n\\nRTC:4-6 weeks Briesida Glow Other (Free Text): Briseida Glow Facial\\n\\nPE:\\n \\nPrior to treatment, all but not limited to treatment indications and expectations(including management of any possible irritations) protocols, risks and benefits were reviewed in detail, including post treatment expectations. All questions were answered prior to administering the treatment.\\n\\nTreatment #1/1\\nTreatment Site: Full face\\n\\nCleanse: [place]li Gentle Cleanser and ZO Exfoliating Polish\\n\\nSelected Serum:Ultra Hydrating Serum\\nGrit Size:6-120\\nPSI:2.5 - 2.7\\n\\nFollowed by the ZO Enzymatic Peel and the Cherry Berry Mask was gently massaged on and occluded with a warm king towel.  Mask was removed within a few minutes or as tolerated.\\n\\nProducts applied:[place] Antioxidant Mist, iS Clinical Active Serum, LifeLine Daily Defense, ZO Primer + SPF, Neocutis Eye Firming and iS Clinical Lip Protector\\n\\nNotes:\\n\\Kim indications, treatment expectations (including management of any possible irritations), protocols, risks and benefits, pre/post care are reviewed. Additional information can be found on consent documentation. Patient understands that multiple treatments may be necessary for optimum results and that on going maintenance with at-home products and additional office visits or treatments may be needed to enhance and extend the desired results.\\n\\nStandard protocol was done.  Following treatment, the expected mild erythema was observed. Patient tolerated the procedure well without immediate complication. Post care was reviewed and a follow up appointment was recommended.\\n\\Kim questions were addressed.\\n\\nRTC:4-6 weeks Briseida Glow

## 2021-07-19 NOTE — HPI: OTHER
Condition:: Acne
Please Describe Your Condition:: is being seen for a chief complaint of Acne. Pt presents for theraclear to axilla and under breast.  Medications, allergies and medical hx were reviewed.
Condition:: Cosmetic
Please Describe Your Condition:: Pt presents for the Briseida Glow facial.  Pt feels like she has congestion.  Medications, allergies and medical hx were reviewed.

## 2021-12-29 ENCOUNTER — APPOINTMENT (OUTPATIENT)
Dept: URBAN - METROPOLITAN AREA CLINIC 211 | Age: 56
Setting detail: DERMATOLOGY
End: 2021-12-29

## 2021-12-29 DIAGNOSIS — L70.0 ACNE VULGARIS: ICD-10-CM

## 2021-12-29 DIAGNOSIS — Z41.9 ENCOUNTER FOR PROCEDURE FOR PURPOSES OTHER THAN REMEDYING HEALTH STATE, UNSPECIFIED: ICD-10-CM

## 2021-12-29 PROCEDURE — OTHER OTHER (COSMETIC): OTHER

## 2021-12-29 PROCEDURE — OTHER MIPS QUALITY: OTHER

## 2021-12-29 PROCEDURE — OTHER ACNE SURGERY: OTHER

## 2021-12-29 PROCEDURE — 10040 ACNE SURGERY: CPT

## 2021-12-29 ASSESSMENT — LOCATION ZONE DERM: LOCATION ZONE: FACE

## 2021-12-29 ASSESSMENT — LOCATION DETAILED DESCRIPTION DERM
LOCATION DETAILED: LEFT INFERIOR MEDIAL MALAR CHEEK
LOCATION DETAILED: LEFT INFERIOR CENTRAL MALAR CHEEK

## 2021-12-29 ASSESSMENT — LOCATION SIMPLE DESCRIPTION DERM: LOCATION SIMPLE: LEFT CHEEK

## 2021-12-29 NOTE — PROCEDURE: OTHER (COSMETIC)
Other (Free Text): Cosmetic\\n\\nPE:\\n\\nPrior to treatment, all but not limited to treatment indications and expectations(including management of any possible irritations) protocols, risks and benefits were reviewed in detail, including post treatment expectations. All questions were answered prior to administering the treatment.\\n\\nPlan: DermaSilk - Dermaplaning is performed first followed by The Briseida Glow.\\n\\nTreatment #1/1\\nTreatment Site: Full face\\n\\nCleanse: PCA a facial wash and PCA smoothing toner\\n\\n\\nAcetone:Yes\\n\\nDermaplaning:\\n\\nApplied per protocol utilizing a #10 blade at a 45-degree angle and with a gentle stroking motion.\\n\\nDiamond Glow Specifics:\\nSelected Serum:vitamin C \\nLot #: [id number]\\nExp: 8.2023\\nGrit Size:9-30\\nPSI:2.5 - 2.6\\n\\nFollowed by ZO enzymatic peel and a Cherry Enzyme Peel  occluded with a warm king towel. Once removed the Areli antioxidant mist and iSC active serum were applied followed by Hydrotant Marine Mask is applied with a warm king towel. Each mask is gently massaged on and removed with in a few minutes or as tolerated.\\n\\nProducts applied: ZO retinol skin brightener, ZO exfoliation accelerator, ZO Brightalive, Neocutis eye cream and Elta md uv tinted spf\\n\\nNotes:\\n\\nStandard protocol was applied. All indications, treatment expectations (including management of any possible irritations), protocols, risks and benefits, pre/post care are reviewed. Details of these can be found on the appropriate attached informed consent documentation. Patient understands that multiple treatments may be necessary for optimum results and that on going maintenance with at-home products and additional office visits or treatments may be needed to enhance and extend the desired results. Post care was reviewed.\\n\\Kim questions questions were addressed.\\n\\nRTC: 4-6 week dermasilk

## 2021-12-29 NOTE — HPI: OTHER
Condition:: Cosmetic
Please Describe Your Condition:: Patient presents in the office for acne surgery and dermasilk. Patient states she does not have any concerns. All medications, allergies and medical history were reviewed.

## 2021-12-29 NOTE — PROCEDURE: OTHER (COSMETIC)
Other (Free Text): Briseida Glow Facial\\n\\nPE:\\n \\nPrior to treatment, all but not limited to treatment indications and expectations(including management of any possible irritations) protocols, risks and benefits were reviewed in detail, including post treatment expectations. All questions were answered prior to administering the treatment.\\n\\nTreatment #1/1\\nTreatment Site: Full face\\n\\nCleanse: Areli Gentle Cleanser and ZO Exfoliating Polish\\n\\nSelected Serum:Ultra Hydrating Serum\\nGrit Size:6-120\\nPSI:2.5 - 2.7\\n\\nFollowed by the ZO Enzymatic Peel and the Cherry Berry Mask was gently massaged on and occluded with a warm king towel.  Mask was removed within a few minutes or as tolerated.\\n\\nProducts applied:Forsyth Antioxidant Mist, iS Clinical Active Serum, LifeLine Daily Defense, ZO Primer + SPF, Neocutis Eye Firming and iS Clinical Lip Protector\\n\\nNotes:\\n\\Kim indications, treatment expectations (including management of any possible irritations), protocols, risks and benefits, pre/post care are reviewed. Additional information can be found on consent documentation. Patient understands that multiple treatments may be necessary for optimum results and that on going maintenance with at-home products and additional office visits or treatments may be needed to enhance and extend the desired results.\\n\\nStandard protocol was done.  Following treatment, the expected mild erythema was observed. Patient tolerated the procedure well without immediate complication. Post care was reviewed and a follow up appointment was recommended.\\n\\Kim questions were addressed.\\n\\nRTC:4-6 weeks Briseida Glow Other (Free Text): Briseida Glow Facial\\n\\nPE:\\n \\nPrior to treatment, all but not limited to treatment indications and expectations(including management of any possible irritations) protocols, risks and benefits were reviewed in detail, including post treatment expectations. All questions were answered prior to administering the treatment.\\n\\nTreatment #1/1\\nTreatment Site: Full face\\n\\nCleanse: Areli Gentle Cleanser and ZO Exfoliating Polish\\n\\nSelected Serum:Ultra Hydrating Serum\\nGrit Size:6-120\\nPSI:2.5 - 2.7\\n\\nFollowed by the ZO Enzymatic Peel and the Cherry Berry Mask was gently massaged on and occluded with a warm king towel.  Mask was removed within a few minutes or as tolerated.\\n\\nProducts applied:[place] Antioxidant Mist, iS Clinical Active Serum, LifeLine Daily Defense, ZO Primer + SPF, Neocutis Eye Firming and iS Clinical Lip Protector\\n\\nNotes:\\n\\Kim indications, treatment expectations (including management of any possible irritations), protocols, risks and benefits, pre/post care are reviewed. Additional information can be found on consent documentation. Patient understands that multiple treatments may be necessary for optimum results and that on going maintenance with at-home products and additional office visits or treatments may be needed to enhance and extend the desired results.\\n\\nStandard protocol was done.  Following treatment, the expected mild erythema was observed. Patient tolerated the procedure well without immediate complication. Post care was reviewed and a follow up appointment was recommended.\\n\\Kim questions were addressed.\\n\\nRTC:4-6 weeks Briseida Glow

## 2021-12-29 NOTE — PROCEDURE: ACNE SURGERY
Extraction Method: lancet and extractor
Detail Level: Zone
Prep Text (Optional): Prior to removal the treatment areas were prepped in the usual fashion.\\n\\nAcne Surgery with TheraClear\\n\\nPE: improvement of open and closed comedones, papules, pustules, acne cysts\\n\\nPrior to treatment, the procedure was reviewed in detail, including all but not limited to discomfort with manual extractions, mild erythema and edema immediately following treatment and post care. All questions were answered prior to administering the treatment.\\n\\nTreatment #1/1\\nSite(s):axilla and bilateral inferior breast\\n\\nEnergy:6\\nVac Level:1\\nPulse Mode:single\\nPulse Width/ms:long\\nTotal Passes:2\\n\\nAcne surgery with Lancet and Comedone Extractor:   Yes\\nIntralesional Injections with Kenalog preparation:      No                                          \\nConcentration: N/A\\nTotal Volume: N/A\\nProducts Applied: Obagi Pore Therapy\\n\\Kim indications, treatment expectations (including management of any possible irritations), protocols, risks and benefits, pre/post care are reviewed. Additional information can be found on consent documentation. Patient understands that multiple treatments may be necessary for optimum results and that on going maintenance with at-home products and additional office visits or treatments may be needed to enhance and extend the desired results.\\n\\nStandard protocol was done. Reviewed with patient that ILS injections as well as manual extractions may be recommended for optimum results. These will be considered prn.  Following treatment, the expected mild erythema was observed. Patient tolerated the procedure well without immediate bruising or complication. Post care was reviewed.\\n\\nRTC:4 weeks theraclear
Post-Care Instructions: I reviewed with the patient in detail post-care instructions. Patient is to keep the treatment areaas dry overnight, and then apply bacitracin twice daily until healed. Patient may apply hydrogen peroxide soaks to remove any crusting.
Render Post-Care Instructions In Note?: no
Acne Type: Comedonal Lesions

## 2022-02-04 ENCOUNTER — APPOINTMENT (OUTPATIENT)
Dept: URBAN - METROPOLITAN AREA CLINIC 211 | Age: 57
Setting detail: DERMATOLOGY
End: 2022-02-07

## 2022-02-04 DIAGNOSIS — L73.2 HIDRADENITIS SUPPURATIVA: ICD-10-CM

## 2022-02-04 DIAGNOSIS — D485 NEOPLASM OF UNCERTAIN BEHAVIOR OF SKIN: ICD-10-CM

## 2022-02-04 PROBLEM — D48.5 NEOPLASM OF UNCERTAIN BEHAVIOR OF SKIN: Status: ACTIVE | Noted: 2022-02-04

## 2022-02-04 PROCEDURE — OTHER BIOPSY BY SHAVE METHOD: OTHER

## 2022-02-04 PROCEDURE — OTHER COUNSELING: OTHER

## 2022-02-04 PROCEDURE — OTHER PRESCRIPTION: OTHER

## 2022-02-04 PROCEDURE — OTHER TREATMENT REGIMEN: OTHER

## 2022-02-04 PROCEDURE — OTHER MIPS QUALITY: OTHER

## 2022-02-04 PROCEDURE — 11102 TANGNTL BX SKIN SINGLE LES: CPT

## 2022-02-04 PROCEDURE — 99213 OFFICE O/P EST LOW 20 MIN: CPT | Mod: 25

## 2022-02-04 RX ORDER — CLINDAMYCIN PHOSPHATE 10 MG/1
SWAB TOPICAL
Qty: 69 | Refills: 4 | Status: ERX | COMMUNITY
Start: 2022-02-04

## 2022-02-04 RX ORDER — SPIRONOLACTONE 50 MG/1
TABLET, FILM COATED ORAL
Qty: 60 | Refills: 10 | Status: ERX | COMMUNITY
Start: 2022-02-04

## 2022-02-04 ASSESSMENT — LOCATION DETAILED DESCRIPTION DERM
LOCATION DETAILED: LEFT GLUTEAL CREASE
LOCATION DETAILED: LEFT INFRAMAMMARY CREASE (INNER QUADRANT)
LOCATION DETAILED: LEFT LATERAL SUPERIOR CHEST
LOCATION DETAILED: RIGHT ANTERIOR PROXIMAL THIGH
LOCATION DETAILED: RIGHT GLUTEAL CREASE
LOCATION DETAILED: RIGHT PROXIMAL POSTERIOR THIGH
LOCATION DETAILED: LEFT PROXIMAL POSTERIOR THIGH
LOCATION DETAILED: LEFT ANTERIOR PROXIMAL THIGH
LOCATION DETAILED: RIGHT INFRAMAMMARY CREASE (INNER QUADRANT)

## 2022-02-04 ASSESSMENT — LOCATION SIMPLE DESCRIPTION DERM
LOCATION SIMPLE: LEFT THIGH
LOCATION SIMPLE: LEFT GUTEAL CREASE
LOCATION SIMPLE: RIGHT BREAST
LOCATION SIMPLE: CHEST
LOCATION SIMPLE: RIGHT GLUTEAL CREASE
LOCATION SIMPLE: LEFT BREAST
LOCATION SIMPLE: RIGHT POSTERIOR THIGH
LOCATION SIMPLE: RIGHT THIGH
LOCATION SIMPLE: LEFT POSTERIOR THIGH

## 2022-02-04 ASSESSMENT — LOCATION ZONE DERM
LOCATION ZONE: LEG
LOCATION ZONE: TRUNK

## 2022-02-04 NOTE — PROCEDURE: BIOPSY BY SHAVE METHOD
Hide Anesthesia Volume?: No
Hemostasis: Aluminum Chloride
Detail Level: Detailed
Size Of Lesion In Cm: 0.4
Dressing: Band-Aid
Curettage Text: The wound bed was treated with curettage after the biopsy was performed.
Electrodesiccation And Curettage Text: The wound bed was treated with electrodesiccation and curettage after the biopsy was performed.
Was A Bandage Applied: Yes
Wound Care: Vaseline
Type Of Destruction Used: Curettage
Cryotherapy Text: The wound bed was treated with cryotherapy after the biopsy was performed.
X Size Of Lesion In Cm: 0
Anesthesia Volume In Cc (Will Not Render If 0): 1.5
Silver Nitrate Text: The wound bed was treated with silver nitrate after the biopsy was performed.
Electrodesiccation Text: The wound bed was treated with electrodesiccation after the biopsy was performed.
Consent: Written consent was obtained and risks were reviewed including but not limited to scarring, infection, bleeding, scabbing, incomplete removal, nerve damage and allergy to anesthesia.
Biopsy Method: Dermablade
Anesthesia Type: 1% lidocaine with epinephrine
Post-Care Instructions: I reviewed with the patient in detail post-care instructions. Patient is to keep the biopsy site dry overnight, and then apply bacitracin twice daily until healed. Patient may apply hydrogen peroxide soaks to remove any crusting.
Information: Selecting Yes will display possible errors in your note based on the variables you have selected. This validation is only offered as a suggestion for you. PLEASE NOTE THAT THE VALIDATION TEXT WILL BE REMOVED WHEN YOU FINALIZE YOUR NOTE. IF YOU WANT TO FAX A PRELIMINARY NOTE YOU WILL NEED TO TOGGLE THIS TO 'NO' IF YOU DO NOT WANT IT IN YOUR FAXED NOTE.
Biopsy Type: H and E
Notification Instructions: Patient will be notified of biopsy results. However, patient instructed to call the office if not contacted within 2 weeks.
Depth Of Biopsy: dermis
Billing Type: Third-Party Bill

## 2022-02-04 NOTE — PROCEDURE: MIPS QUALITY
Quality 130: Documentation Of Current Medications In The Medical Record: Current Medications Documented
Quality 431: Preventive Care And Screening: Unhealthy Alcohol Use - Screening: Patient screened for unhealthy alcohol use using a single question and scores less than 2 times per year
Quality 226: Preventive Care And Screening: Tobacco Use: Screening And Cessation Intervention: Patient screened for tobacco use and is an ex/non-smoker
Quality 265: Biopsy Follow-Up: Biopsy results reviewed, communicated, tracked, and documented
Quality 431: Preventive Care And Screening: Unhealthy Alcohol Use - Screening: Patient not identified as an unhealthy alcohol user when screened for unhealthy alcohol use using a systematic screening method
Detail Level: Detailed
Quality 110: Preventive Care And Screening: Influenza Immunization: Influenza Immunization previously received during influenza season

## 2022-02-21 ENCOUNTER — APPOINTMENT (OUTPATIENT)
Dept: URBAN - METROPOLITAN AREA CLINIC 211 | Age: 57
Setting detail: DERMATOLOGY
End: 2022-02-23

## 2022-02-21 DIAGNOSIS — L70.0 ACNE VULGARIS: ICD-10-CM

## 2022-02-21 DIAGNOSIS — Z41.9 ENCOUNTER FOR PROCEDURE FOR PURPOSES OTHER THAN REMEDYING HEALTH STATE, UNSPECIFIED: ICD-10-CM

## 2022-02-21 DIAGNOSIS — L72.8 OTHER FOLLICULAR CYSTS OF THE SKIN AND SUBCUTANEOUS TISSUE: ICD-10-CM

## 2022-02-21 PROBLEM — L85.3 XEROSIS CUTIS: Status: ACTIVE | Noted: 2022-02-21

## 2022-02-21 PROCEDURE — OTHER MIPS QUALITY: OTHER

## 2022-02-21 PROCEDURE — 11900 INJECT SKIN LESIONS </W 7: CPT

## 2022-02-21 PROCEDURE — OTHER ACNE SURGERY: OTHER

## 2022-02-21 PROCEDURE — 10040 ACNE SURGERY: CPT

## 2022-02-21 PROCEDURE — OTHER OTHER (COSMETIC): OTHER

## 2022-02-21 PROCEDURE — OTHER INTRALESIONAL KENALOG: OTHER

## 2022-02-21 ASSESSMENT — LOCATION DETAILED DESCRIPTION DERM
LOCATION DETAILED: LEFT INFERIOR MEDIAL MALAR CHEEK
LOCATION DETAILED: LEFT ANTERIOR MEDIAL PROXIMAL UPPER ARM
LOCATION DETAILED: LEFT INFERIOR CENTRAL MALAR CHEEK
LOCATION DETAILED: LEFT INFRAMAMMARY CREASE (OUTER QUADRANT)

## 2022-02-21 ASSESSMENT — LOCATION SIMPLE DESCRIPTION DERM
LOCATION SIMPLE: LEFT CHEEK
LOCATION SIMPLE: LEFT UPPER ARM
LOCATION SIMPLE: LEFT BREAST

## 2022-02-21 ASSESSMENT — LOCATION ZONE DERM
LOCATION ZONE: TRUNK
LOCATION ZONE: FACE
LOCATION ZONE: ARM

## 2022-02-21 NOTE — PROCEDURE: ACNE SURGERY
Extraction Method: lancet and extractor
Acne Type: Comedonal Lesions
Post-Care Instructions: I reviewed with the patient in detail post-care instructions. Patient is to keep the treatment areaas dry overnight, and then apply bacitracin twice daily until healed. Patient may apply hydrogen peroxide soaks to remove any crusting.
Prep Text (Optional): Prior to removal the treatment areas were prepped in the usual fashion.\\n\\nAcne Surgery with TheraClear\\n\\nPE: improvement of open and closed comedones, papules, pustules, acne cysts\\n\\nPrior to treatment, the procedure was reviewed in detail, including all but not limited to discomfort with manual extractions, mild erythema and edema immediately following treatment and post care. All questions were answered prior to administering the treatment.\\n\\nTreatment #1\\nSite(s):axilla and under breasts\\n\\nEnergy:7\\nVac Level:1\\nPulse Mode:single\\nPulse Width/ms:long\\nTotal Passes:3\\n\\nAcne surgery with Lancet and Comedone Extractor:   Yes\\nIntralesional Injections with Kenalog preparation:      Yes                                          \\nConcentration: TAC3\\nTotal Volume: 0.2 cc\\nProducts Applied: PCA Nutrient Toner\\n\\Kim indications, treatment expectations (including management of any possible irritations), protocols, risks and benefits, pre/post care are reviewed. Additional information can be found on consent documentation. Patient understands that multiple treatments may be necessary for optimum results and that on going maintenance with at-home products and additional office visits or treatments may be needed to enhance and extend the desired results.\\n\\nStandard protocol was done. Reviewed with patient that ILS injections as well as manual extractions may be recommended for optimum results. These will be considered prn.  Following treatment, the expected mild erythema was observed. Patient tolerated the procedure well without immediate bruising or complication. Post care was reviewed.\\n\\nRTC:4 weeks Theraclear
Render Number Of Lesions Treated: no
Detail Level: Zone

## 2022-02-21 NOTE — PROCEDURE: MIPS QUALITY
Quality 431: Preventive Care And Screening: Unhealthy Alcohol Use - Screening: Patient screened for unhealthy alcohol use using a single question and scores less than 2 times per year
Quality 265: Biopsy Follow-Up: Biopsy results reviewed, communicated, tracked, and documented
Quality 130: Documentation Of Current Medications In The Medical Record: Current Medications Documented
Quality 431: Preventive Care And Screening: Unhealthy Alcohol Use - Screening: Patient not identified as an unhealthy alcohol user when screened for unhealthy alcohol use using a systematic screening method
Quality 110: Preventive Care And Screening: Influenza Immunization: Influenza Immunization previously received during influenza season
Quality 226: Preventive Care And Screening: Tobacco Use: Screening And Cessation Intervention: Patient screened for tobacco use and is an ex/non-smoker
Detail Level: Detailed

## 2022-02-21 NOTE — HPI: OTHER
Condition:: Cosmetic and Acne
Please Describe Your Condition:: Pt presents for dermasilk for full face and theraclear for the underarms and below the breasts.  Medications, allergies and medical hx were reviewed.

## 2022-02-21 NOTE — PROCEDURE: OTHER (COSMETIC)
Detail Level: Zone
Other (Free Text): Cosmetic\\n\\nPE:\\n\\nPrior to treatment, all but not limited to treatment indications and expectations(including management of any possible irritations) protocols, risks and benefits were reviewed in detail, including post treatment expectations. All questions were answered prior to administering the treatment.\\n\\nPlan: DermaSilk - Dermaplaning is performed first followed by The Briseida Glow.\\n\\nTreatment #:1\\nTreatment Site:full face\\n\\nCleanse:LifeLine Polishing Gelee\\n\\nAcetone:Yes\\n\\nDermaplaning:\\n\\nApplied per protocol utilizing a #10 blade at a 45-degree angle and with a gentle stroking motion.\\n\\nDialatoya Barnettw Specifics:\\nSelected Serum:Ultra Hydrating Serum\\nLot#:[id number]\\nExp:11/23\\nGrit Size:9-60\\nApprox PSI:2.5 - 2.7\\n\\nFollowed by the ZO Enzymatic Peel and a Cherry Enzyme Peel occluded with a warm king towel. Once removed the PCA Daily Defense Mist was used followed by a Hydrotant Marine Mask is applied with a warm king towel. Each mask is gently massaged on and removed with in a few minutes or as tolerated.\\n\\nProducts applied:LifeLine Advanced Aqueous Treatment, LifeLine Daily Defense Complex, PCA Weightless SPF 45, LifeLine Eye Firming Cream and PCA HA Lip Booster\\n\\nNotes:\\n\\nStandard protocol was applied. All indications, treatment expectations (including management of any possible irritations), protocols, risks and benefits, pre/post care are reviewed. Details of these can be found on the appropriate attached informed consent documentation. Patient understands that multiple treatments may be necessary for optimum results and that on going maintenance with at-home products and additional office visits or treatments may be needed to enhance and extend the desired results. Post care was reviewed.\\n\\Kim questions questions were addressed.\\n\\nRTC:4-6 week Dermasilk

## 2022-02-28 ENCOUNTER — APPOINTMENT (OUTPATIENT)
Dept: URBAN - METROPOLITAN AREA CLINIC 211 | Age: 57
Setting detail: DERMATOLOGY
End: 2022-02-28

## 2022-02-28 DIAGNOSIS — D22 MELANOCYTIC NEVI: ICD-10-CM

## 2022-02-28 DIAGNOSIS — D18.0 HEMANGIOMA: ICD-10-CM

## 2022-02-28 DIAGNOSIS — Z71.89 OTHER SPECIFIED COUNSELING: ICD-10-CM

## 2022-02-28 DIAGNOSIS — L82.1 OTHER SEBORRHEIC KERATOSIS: ICD-10-CM

## 2022-02-28 DIAGNOSIS — L72.8 OTHER FOLLICULAR CYSTS OF THE SKIN AND SUBCUTANEOUS TISSUE: ICD-10-CM

## 2022-02-28 DIAGNOSIS — L73.2 HIDRADENITIS SUPPURATIVA: ICD-10-CM

## 2022-02-28 DIAGNOSIS — L81.4 OTHER MELANIN HYPERPIGMENTATION: ICD-10-CM

## 2022-02-28 PROBLEM — D22.5 MELANOCYTIC NEVI OF TRUNK: Status: ACTIVE | Noted: 2022-02-28

## 2022-02-28 PROBLEM — L57.0 ACTINIC KERATOSIS: Status: ACTIVE | Noted: 2022-02-28

## 2022-02-28 PROBLEM — C44.529 SQUAMOUS CELL CARCINOMA OF SKIN OF OTHER PART OF TRUNK: Status: ACTIVE | Noted: 2022-02-28

## 2022-02-28 PROBLEM — D18.01 HEMANGIOMA OF SKIN AND SUBCUTANEOUS TISSUE: Status: ACTIVE | Noted: 2022-02-28

## 2022-02-28 PROBLEM — L85.3 XEROSIS CUTIS: Status: ACTIVE | Noted: 2022-02-28

## 2022-02-28 PROCEDURE — OTHER PATHOLOGY DISCUSSION: OTHER

## 2022-02-28 PROCEDURE — OTHER TREATMENT REGIMEN: OTHER

## 2022-02-28 PROCEDURE — OTHER COUNSELING: OTHER

## 2022-02-28 PROCEDURE — OTHER REASSURANCE: OTHER

## 2022-02-28 PROCEDURE — 17260 DSTRJ MAL LES T/A/L 0.5 CM/<: CPT | Mod: 79

## 2022-02-28 PROCEDURE — OTHER CURETTAGE AND DESTRUCTION: OTHER

## 2022-02-28 PROCEDURE — OTHER SUNSCREEN RECOMMENDATIONS: OTHER

## 2022-02-28 PROCEDURE — OTHER MIPS QUALITY: OTHER

## 2022-02-28 ASSESSMENT — LOCATION SIMPLE DESCRIPTION DERM
LOCATION SIMPLE: RIGHT GLUTEAL CREASE
LOCATION SIMPLE: LEFT SHOULDER
LOCATION SIMPLE: UPPER BACK
LOCATION SIMPLE: LEFT BREAST
LOCATION SIMPLE: RIGHT BREAST
LOCATION SIMPLE: ABDOMEN
LOCATION SIMPLE: RIGHT POSTERIOR THIGH
LOCATION SIMPLE: LEFT POSTERIOR THIGH
LOCATION SIMPLE: LEFT GUTEAL CREASE
LOCATION SIMPLE: RIGHT UPPER BACK
LOCATION SIMPLE: LEFT THIGH
LOCATION SIMPLE: RIGHT THIGH

## 2022-02-28 ASSESSMENT — LOCATION ZONE DERM
LOCATION ZONE: LEG
LOCATION ZONE: TRUNK
LOCATION ZONE: ARM

## 2022-02-28 ASSESSMENT — LOCATION DETAILED DESCRIPTION DERM
LOCATION DETAILED: RIGHT INFRAMAMMARY CREASE (INNER QUADRANT)
LOCATION DETAILED: LEFT GLUTEAL CREASE
LOCATION DETAILED: RIGHT INFERIOR MEDIAL UPPER BACK
LOCATION DETAILED: LEFT POSTERIOR SHOULDER
LOCATION DETAILED: SUPERIOR THORACIC SPINE
LOCATION DETAILED: EPIGASTRIC SKIN
LOCATION DETAILED: RIGHT PROXIMAL POSTERIOR THIGH
LOCATION DETAILED: RIGHT SUPERIOR UPPER BACK
LOCATION DETAILED: RIGHT GLUTEAL CREASE
LOCATION DETAILED: LEFT INFRAMAMMARY CREASE (INNER QUADRANT)
LOCATION DETAILED: RIGHT ANTERIOR PROXIMAL THIGH
LOCATION DETAILED: LEFT ANTERIOR PROXIMAL THIGH
LOCATION DETAILED: LEFT PROXIMAL POSTERIOR THIGH

## 2022-02-28 NOTE — PROCEDURE: CURETTAGE AND DESTRUCTION
Anesthesia Type: 1% lidocaine with epinephrine and a 1:10 solution of 8.4% sodium bicarbonate
Cautery Type: electrodesiccation
Additional Information: (Optional): The wound was cleaned, and a pressure dressing was applied.  The patient received detailed post-op instructions.
Hide Accession Number?: No
Render Post-Care Instructions In Note?: yes
Size Of Lesion After Curettage: 0
Number Of Curettages: 3
Total Volume (Ccs): 1
Post-Care Instructions: I reviewed with the patient in detail post-care instructions. Patient is to keep the area dry for 48 hours, and not to engage in any swimming until the area is healed. Should the patient develop any fevers, chills, bleeding, severe pain patient will contact the office immediately.
Concentration (Mg/Ml Or Millions Of Plaque Forming Units/Cc): 0.01
Final Size Statement: The size of the lesion after curettage was
What Was Performed First?: Destruction
Detail Level: Detailed
Bill As A Line Item Or As Units: Line Item
Consent was obtained from the patient. The risks, benefits and alternatives to therapy were discussed in detail. Specifically, the risks of infection, scarring, bleeding, prolonged wound healing, nerve injury, incomplete removal, allergy to anesthesia and recurrence were addressed. Alternatives to ED&C, such as: surgical removal and XRT were also discussed.  Prior to the procedure, the treatment site was clearly identified and confirmed by the patient. All components of Universal Protocol/PAUSE Rule completed.
Size Of Lesion In Cm: 0.8

## 2022-02-28 NOTE — PROCEDURE: TREATMENT REGIMEN
Plan: Reassure no specific treatment is needed. Lesion is stable. It is possible this becomes inflamed
Detail Level: Generalized
Plan: 1. Well controlled, ok to call for work in appointments for ILK as needed if she has any flares. Otherwise continue plan as last discussed.
Detail Level: Zone

## 2022-02-28 NOTE — PROCEDURE: MIPS QUALITY
Quality 226: Preventive Care And Screening: Tobacco Use: Screening And Cessation Intervention: Patient screened for tobacco use and is an ex/non-smoker
Quality 400a: One-Time Screening For Hepatitis C Virus (Hcv) For All Patients: Patient received one-time screening for HCV infection
Quality 110: Preventive Care And Screening: Influenza Immunization: Influenza Immunization previously received during influenza season
Quality 431: Preventive Care And Screening: Unhealthy Alcohol Use - Screening: Patient screened for unhealthy alcohol use using a single question and scores less than 2 times per year
Quality 130: Documentation Of Current Medications In The Medical Record: Current Medications Documented
Detail Level: Detailed
Quality 111:Pneumonia Vaccination Status For Older Adults: Pneumococcal Vaccination Previously Received
Quality 431: Preventive Care And Screening: Unhealthy Alcohol Use - Screening: Patient not identified as an unhealthy alcohol user when screened for unhealthy alcohol use using a systematic screening method

## 2022-03-14 ENCOUNTER — APPOINTMENT (OUTPATIENT)
Dept: URBAN - METROPOLITAN AREA CLINIC 211 | Age: 57
Setting detail: DERMATOLOGY
End: 2022-03-14

## 2022-03-14 DIAGNOSIS — Z41.9 ENCOUNTER FOR PROCEDURE FOR PURPOSES OTHER THAN REMEDYING HEALTH STATE, UNSPECIFIED: ICD-10-CM

## 2022-03-14 DIAGNOSIS — L72.8 OTHER FOLLICULAR CYSTS OF THE SKIN AND SUBCUTANEOUS TISSUE: ICD-10-CM

## 2022-03-14 DIAGNOSIS — L70.0 ACNE VULGARIS: ICD-10-CM

## 2022-03-14 PROCEDURE — 11900 INJECT SKIN LESIONS </W 7: CPT

## 2022-03-14 PROCEDURE — OTHER INTRALESIONAL KENALOG: OTHER

## 2022-03-14 PROCEDURE — OTHER ACNE SURGERY: OTHER

## 2022-03-14 PROCEDURE — OTHER OTHER (COSMETIC): OTHER

## 2022-03-14 PROCEDURE — 10040 ACNE SURGERY: CPT

## 2022-03-14 PROCEDURE — OTHER MIPS QUALITY: OTHER

## 2022-03-14 ASSESSMENT — LOCATION DETAILED DESCRIPTION DERM
LOCATION DETAILED: LEFT INFERIOR CENTRAL MALAR CHEEK
LOCATION DETAILED: LEFT INFERIOR MEDIAL MALAR CHEEK
LOCATION DETAILED: RIGHT RIB CAGE

## 2022-03-14 ASSESSMENT — LOCATION SIMPLE DESCRIPTION DERM
LOCATION SIMPLE: ABDOMEN
LOCATION SIMPLE: LEFT CHEEK

## 2022-03-14 ASSESSMENT — LOCATION ZONE DERM
LOCATION ZONE: FACE
LOCATION ZONE: TRUNK

## 2022-03-14 NOTE — PROCEDURE: INTRALESIONAL KENALOG
Validate Note Data When Using Inventory: Yes
Consent: The risks of atrophy were reviewed with the patient.
Detail Level: Simple
Total Volume Injected (Ccs- Only Use Numbers And Decimals): 0.3
X Size Of Lesion In Cm (Optional): 0
Concentration Of Solution Injected (Mg/Ml): 3.0
Administered By (Optional): Heidi
Medical Necessity Clause: This procedure was medically necessary because the lesions that were treated were:
Include Z78.9 (Other Specified Conditions Influencing Health Status) As An Associated Diagnosis?: No
Kenalog Preparation: Kenalog

## 2022-03-14 NOTE — HPI: OTHER
Condition:: Cosmetic and Acne
Please Describe Your Condition:: Pt presents for Dermasilk to full face and acne surgery and Theraclear to under the breast.  Pt feels like her skin needs some hydration and that he HS sometimes flares but now is doing pretty good.  Medications, allergies and medical hx were reviewed.

## 2022-03-14 NOTE — PROCEDURE: MIPS QUALITY
Quality 400a: One-Time Screening For Hepatitis C Virus (Hcv) For All Patients: Patient received one-time screening for HCV infection
Detail Level: Detailed
Quality 111:Pneumonia Vaccination Status For Older Adults: Pneumococcal Vaccination Previously Received
Quality 130: Documentation Of Current Medications In The Medical Record: Current Medications Documented
Quality 110: Preventive Care And Screening: Influenza Immunization: Influenza Immunization previously received during influenza season
Quality 431: Preventive Care And Screening: Unhealthy Alcohol Use - Screening: Patient screened for unhealthy alcohol use using a single question and scores less than 2 times per year
Quality 431: Preventive Care And Screening: Unhealthy Alcohol Use - Screening: Patient not identified as an unhealthy alcohol user when screened for unhealthy alcohol use using a systematic screening method
Quality 226: Preventive Care And Screening: Tobacco Use: Screening And Cessation Intervention: Patient screened for tobacco use and is an ex/non-smoker

## 2022-03-14 NOTE — PROCEDURE: OTHER (COSMETIC)
Detail Level: Zone
Other (Free Text): Cosmetic\\n\\nPE:\\n\\nPrior to treatment, all but not limited to treatment indications and expectations(including management of any possible irritations) protocols, risks and benefits were reviewed in detail, including post treatment expectations. All questions were answered prior to administering the treatment.\\n\\nPlan: DermaSilk - Dermaplaning is performed first followed by The Briseida Glow.\\n\\nTreatment #:1/1\\nTreatment Site:full face\\n\\nCleanse:\\n\\nAcetone:Yes\\n\\nDermaplaning:\\n\\nApplied per protocol utilizing a #10 blade at a 45-degree angle and with a gentle stroking motion.\\n\\nDiamond Glow Specifics:\\nSelected Serum:\\nLot#:\\nExp:\\nGrit Size:\\nApprox PSI:\\n\\nFollowed by Cherry Enzyme Peel occluded with a warm king towel. Once removed a Hydrotant Marine Mask is applied with a warm king towel. Each mask is gently massaged on and removed with in a few minutes or as tolerated.\\n\\nProducts applied:\\n\\nNotes:\\n\\nStandard protocol was applied. All indications, treatment expectations (including management of any possible irritations), protocols, risks and benefits, pre/post care are reviewed. Details of these can be found on the appropriate attached informed consent documentation. Patient understands that multiple treatments may be necessary for optimum results and that on going maintenance with at-home products and additional office visits or treatments may be needed to enhance and extend the desired results. Post care was reviewed.\\n\\Kim questions questions were addressed.\\n\\nRTC:

## 2022-04-12 ENCOUNTER — APPOINTMENT (OUTPATIENT)
Dept: URBAN - METROPOLITAN AREA CLINIC 211 | Age: 57
Setting detail: DERMATOLOGY
End: 2022-04-12

## 2022-04-12 PROCEDURE — OTHER MIPS QUALITY: OTHER

## 2022-04-12 NOTE — PROCEDURE: MIPS QUALITY
Quality 431: Preventive Care And Screening: Unhealthy Alcohol Use - Screening: Patient screened for unhealthy alcohol use using a single question and scores less than 2 times per year
Quality 431: Preventive Care And Screening: Unhealthy Alcohol Use - Screening: Patient not identified as an unhealthy alcohol user when screened for unhealthy alcohol use using a systematic screening method
Quality 130: Documentation Of Current Medications In The Medical Record: Current Medications Documented
Detail Level: Detailed
Quality 110: Preventive Care And Screening: Influenza Immunization: Influenza Immunization previously received during influenza season
Quality 400a: One-Time Screening For Hepatitis C Virus (Hcv) For All Patients: Patient received one-time screening for HCV infection
Quality 226: Preventive Care And Screening: Tobacco Use: Screening And Cessation Intervention: Patient screened for tobacco use and is an ex/non-smoker

## 2022-05-06 ENCOUNTER — APPOINTMENT (OUTPATIENT)
Dept: URBAN - METROPOLITAN AREA CLINIC 211 | Age: 57
Setting detail: DERMATOLOGY
End: 2022-05-09

## 2022-05-06 DIAGNOSIS — Z41.9 ENCOUNTER FOR PROCEDURE FOR PURPOSES OTHER THAN REMEDYING HEALTH STATE, UNSPECIFIED: ICD-10-CM

## 2022-05-06 DIAGNOSIS — L70.0 ACNE VULGARIS: ICD-10-CM

## 2022-05-06 PROCEDURE — OTHER ACNE SURGERY: OTHER

## 2022-05-06 PROCEDURE — OTHER MIPS QUALITY: OTHER

## 2022-05-06 PROCEDURE — 10040 ACNE SURGERY: CPT

## 2022-05-06 PROCEDURE — OTHER OTHER (COSMETIC): OTHER

## 2022-05-06 ASSESSMENT — LOCATION ZONE DERM: LOCATION ZONE: FACE

## 2022-05-06 ASSESSMENT — LOCATION SIMPLE DESCRIPTION DERM: LOCATION SIMPLE: LEFT CHEEK

## 2022-05-06 NOTE — HPI: OTHER
Condition:: Acne and cosmetic
Please Describe Your Condition:: Pt presents for Theraclear to the under arms and under her breast.  Pt states she has been doing well.  Pt also presents for dermasilk.  Pt denies any specific concerns to her skin.  Medications, allergies and medical hx were reviewed.

## 2022-05-06 NOTE — PROCEDURE: ACNE SURGERY
Extraction Method: lancet and extractor
Render Post-Care Instructions In Note?: no
Acne Type: Comedonal Lesions
Post-Care Instructions: I reviewed with the patient in detail post-care instructions. Patient is to keep the treatment areaas dry overnight, and then apply bacitracin twice daily until healed. Patient may apply hydrogen peroxide soaks to remove any crusting.
Detail Level: Zone
Prep Text (Optional): Prior to removal the treatment areas were prepped in the usual fashion.\\n\\nAcne Surgery with TheraClear\\n\\nPE: improvement of open and closed comedones, papules, pustules, acne cysts\\n\\nPrior to treatment, the procedure was reviewed in detail, including all but not limited to discomfort with manual extractions, mild erythema and edema immediately following treatment and post care. All questions were answered prior to administering the treatment.\\n\\nTreatment #1/1\\nSite(s):axilla and under breast\\n\\nEnergy:\\nVac Level:\\nPulse Mode:\\nPulse Width/ms:\\nTotal Passes:\\n\\nAcne surgery with Lancet and Comedone Extractor:   Yes\\nIntralesional Injections with Kenalog preparation:      No                                          \\nConcentration: N/A\\nTotal Volume: N/A\\nProducts Applied: Obagi Pore Therapy and Obagi NuDerm Sun Shield SPF 50\\n\\Kim indications, treatment expectations (including management of any possible irritations), protocols, risks and benefits, pre/post care are reviewed. Additional information can be found on consent documentation. Patient understands that multiple treatments may be necessary for optimum results and that on going maintenance with at-home products and additional office visits or treatments may be needed to enhance and extend the desired results.\\n\\nStandard protocol was done. Reviewed with patient that ILS injections as well as manual extractions may be recommended for optimum results. These will be considered prn.  Following treatment, the expected mild erythema was observed. Patient tolerated the procedure well without immediate bruising or complication. Post care was reviewed.\\n\\nRTC:

## 2022-05-06 NOTE — PROCEDURE: OTHER (COSMETIC)
Other (Free Text): Cosmetic\\n\\nPE:\\n\\nPrior to treatment, all but not limited to treatment indications and expectations(including management of any possible irritations) protocols, risks and benefits were reviewed in detail, including post treatment expectations. All questions were answered prior to administering the treatment.\\n\\nPlan: DermaSilk - Dermaplaning is performed first followed by The Briseida Glow.\\n\\nTreatment #:1/1\\nTreatment Site:full face\\n\\nCleanse:\\n\\nAcetone:Yes\\n\\nDermaplaning:\\n\\nApplied per protocol utilizing a #10 blade at a 45-degree angle and with a gentle stroking motion.\\n\\nDialatoya Barnettw Specifics:\\nSelected Serum:vitamin C\\nLot#:[id number]\\nExp:08/23\\nGrit Size:6-100\\nApprox PSI:\\n\\nFollowed by Cherry Enzyme Peel occluded with a warm king towel. Once removed a Hydrotant Marine Mask is applied with a warm king towel. Each mask is gently massaged on and removed with in a few minutes or as tolerated.\\n\\nProducts applied:\\n\\nNotes:\\n\\nStandard protocol was applied. All indications, treatment expectations (including management of any possible irritations), protocols, risks and benefits, pre/post care are reviewed. Details of these can be found on the appropriate attached informed consent documentation. Patient understands that multiple treatments may be necessary for optimum results and that on going maintenance with at-home products and additional office visits or treatments may be needed to enhance and extend the desired results. Post care was reviewed.\\n\\Kim questions questions were addressed.\\n\\nRTC:
Detail Level: Zone

## 2022-05-06 NOTE — PROCEDURE: MIPS QUALITY
Quality 226: Preventive Care And Screening: Tobacco Use: Screening And Cessation Intervention: Patient screened for tobacco use and is an ex/non-smoker
Quality 431: Preventive Care And Screening: Unhealthy Alcohol Use - Screening: Patient screened for unhealthy alcohol use using a single question and scores less than 2 times per year
Quality 130: Documentation Of Current Medications In The Medical Record: Current Medications Documented
Quality 110: Preventive Care And Screening: Influenza Immunization: Influenza Immunization previously received during influenza season
Detail Level: Detailed
Quality 111:Pneumonia Vaccination Status For Older Adults: Pneumococcal Vaccination Previously Received
Quality 400a: One-Time Screening For Hepatitis C Virus (Hcv) For All Patients: Patient received one-time screening for HCV infection
Quality 431: Preventive Care And Screening: Unhealthy Alcohol Use - Screening: Patient not identified as an unhealthy alcohol user when screened for unhealthy alcohol use using a systematic screening method

## 2022-06-01 NOTE — PROCEDURE: FILLERS
Elizabeth Cox needs to be seen for an appointment before further refills are given.   Detail Level: Detailed

## 2022-06-20 ENCOUNTER — APPOINTMENT (OUTPATIENT)
Dept: URBAN - METROPOLITAN AREA CLINIC 211 | Age: 57
Setting detail: DERMATOLOGY
End: 2022-06-21

## 2022-06-20 DIAGNOSIS — Z41.9 ENCOUNTER FOR PROCEDURE FOR PURPOSES OTHER THAN REMEDYING HEALTH STATE, UNSPECIFIED: ICD-10-CM

## 2022-06-20 PROCEDURE — OTHER MIPS QUALITY: OTHER

## 2022-06-20 PROCEDURE — OTHER OTHER (COSMETIC): OTHER

## 2022-06-20 ASSESSMENT — LOCATION ZONE DERM: LOCATION ZONE: FACE

## 2022-06-20 ASSESSMENT — LOCATION DETAILED DESCRIPTION DERM
LOCATION DETAILED: RIGHT CENTRAL MALAR CHEEK
LOCATION DETAILED: LEFT INFERIOR CENTRAL MALAR CHEEK

## 2022-06-20 ASSESSMENT — LOCATION SIMPLE DESCRIPTION DERM
LOCATION SIMPLE: RIGHT CHEEK
LOCATION SIMPLE: LEFT CHEEK

## 2022-06-20 NOTE — PROCEDURE: OTHER (COSMETIC)
Detail Level: Zone
Other (Free Text): Procedure Note:\\nComedonal Lesions located on the face were removed with a lancet and extractor. Prior to removal the treatment areas were prepped in the usual fashion.\\n\\nAcne Surgery with TheraClear\\n\\nPE: \\n\\nPrior to treatment, the procedure was reviewed in detail, including all but not limited to discomfort with manual extractions, mild erythema and edema immediately following treatment and post care. All questions were answered prior to administering the treatment.\\n\\nTreatment #\\nSite(s):\\n\\nEnergy:\\nVac Level:\\nPulse Mode:\\nPulse Width/ms:\\nTotal Passes:\\n\\nAcne surgery with Lancet and Comedone Extractor: Yes\\nIntralesional Injections with Kenalog preparation: No\\nConcentration: N/A\\nTotal Volume: N/A\\nProducts Applied: Obagi Pore Therapy and Obagi NuDerm Sun Shield SPF 50\\n\\Kim indications, treatment expectations (including management of any possible irritations), protocols, risks and benefits, pre/post care are reviewed. Additional information can be found on consent documentation. Patient understands that multiple treatments may be necessary for optimum results and that on going maintenance with at-home products and additional office visits or treatments may be needed to enhance and extend the desired results.\\n\\nStandard protocol was done. Reviewed with patient that ILS injections as well as manual extractions may be recommended for optimum results. These will be considered prn. Following treatment, the expected mild erythema was observed. Patient tolerated the procedure well without immediate bruising or complication. Post care was reviewed.\\n\\nRTC:
Other (Free Text): Cosmetic\\n\\nPE:\\n\\nPrior to treatment, all but not limited to treatment indications and expectations(including management of any possible irritations) protocols, risks and benefits were reviewed in detail, including post treatment expectations. All questions were answered prior to administering the treatment.\\n\\nPlan: DermaSilk - Dermaplaning is performed first followed by The [facility].\\n\\nTreatment #:\\nTreatment Site:\\n\\nCleanse:\\n\\nAcetone:Yes\\n\\nDermaplaning:\\n\\nApplied per protocol utilizing a #10 blade at a 45-degree angle and with a gentle stroking motion.\\n\\nDiamond Glow Specifics:\\nSelected Serum:\\nLot#:\\nExp:\\nGrit Size:\\nApprox PSI:\\n\\nFollowed by Cherry Enzyme Peel occluded with a warm king towel. Once removed a Hydrotant Marine Mask is applied with a warm king towel. Each mask is gently massaged on and removed with in a few minutes or as tolerated.\\n\\nProducts applied:\\n\\nNotes:\\n\\nStandard protocol was applied. All indications, treatment expectations (including management of any possible irritations), protocols, risks and benefits, pre/post care are reviewed. Details of these can be found on the appropriate attached informed consent documentation. Patient understands that multiple treatments may be necessary for optimum results and that on going maintenance with at-home products and additional office visits or treatments may be needed to enhance and extend the desired results. Post care was reviewed.\\n\\Kim questions questions were addressed.\\n\\nRTC:
Airway patent, TM normal bilaterally, normal appearing mouth, nose, throat, neck supple with full range of motion, no cervical adenopathy.

## 2022-06-20 NOTE — HPI: OTHER
Condition:: Cosmetic
Please Describe Your Condition:: Acne surgery combine with Dermasilk , under breast and axilla

## 2022-06-20 NOTE — PROCEDURE: MIPS QUALITY
Quality 226: Preventive Care And Screening: Tobacco Use: Screening And Cessation Intervention: Patient screened for tobacco use and is an ex/non-smoker
Quality 431: Preventive Care And Screening: Unhealthy Alcohol Use - Screening: Patient screened for unhealthy alcohol use using a single question and scores less than 2 times per year
Quality 110: Preventive Care And Screening: Influenza Immunization: Influenza Immunization previously received during influenza season
Quality 431: Preventive Care And Screening: Unhealthy Alcohol Use - Screening: Patient not identified as an unhealthy alcohol user when screened for unhealthy alcohol use using a systematic screening method
Quality 130: Documentation Of Current Medications In The Medical Record: Current Medications Documented
Detail Level: Detailed

## 2022-06-26 NOTE — PROCEDURE: ACNE SURGERY
Acne Type: Comedonal Lesions
Extraction Method: lancet and extractor
Post-Care Instructions: I reviewed with the patient in detail post-care instructions. Patient is to keep the treatment areaas dry overnight, and then apply bacitracin twice daily until healed. Patient may apply hydrogen peroxide soaks to remove any crusting.
Detail Level: Zone
Prep Text (Optional): Prior to removal the treatment areas were prepped in the usual fashion.\\n\\nAcne Surgery with TheraClear\\n\\nPE: improvement of open and closed comedones, papules, pustules, acne cysts\\n\\nPrior to treatment, the procedure was reviewed in detail, including all but not limited to discomfort with manual extractions, mild erythema and edema immediately following treatment and post care. All questions were answered prior to administering the treatment.\\n\\nTreatment #1/1\\nSite(s):under breast and axilla\\n\\nEnergy:\\nVac Level:\\nPulse Mode:\\nPulse Width/ms:\\nTotal Passes:\\n\\nAcne surgery with Lancet and Comedone Extractor:   Yes\\nIntralesional Injections with Kenalog preparation:      No                                          \\nConcentration: N/A\\nTotal Volume: N/A\\nProducts Applied: Obagi Pore Therapy and Obagi NuDerm Sun Shield SPF 50\\n\\Kim indications, treatment expectations (including management of any possible irritations), protocols, risks and benefits, pre/post care are reviewed. Additional information can be found on consent documentation. Patient understands that multiple treatments may be necessary for optimum results and that on going maintenance with at-home products and additional office visits or treatments may be needed to enhance and extend the desired results.\\n\\nStandard protocol was done. Reviewed with patient that ILS injections as well as manual extractions may be recommended for optimum results. These will be considered prn.  Following treatment, the expected mild erythema was observed. Patient tolerated the procedure well without immediate bruising or complication. Post care was reviewed.\\n\\nRTC:
Render Number Of Lesions Treated: no
Implemented All Universal Safety Interventions:  [place] to call system. Call bell, personal items and telephone within reach. Instruct patient to call for assistance. Room bathroom lighting operational. Non-slip footwear when patient is off stretcher. Physically safe environment: no spills, clutter or unnecessary equipment. Stretcher in lowest position, wheels locked, appropriate side rails in place.

## 2022-08-08 ENCOUNTER — APPOINTMENT (OUTPATIENT)
Dept: URBAN - METROPOLITAN AREA CLINIC 211 | Age: 57
Setting detail: DERMATOLOGY
End: 2022-08-08

## 2022-08-08 DIAGNOSIS — Z41.9 ENCOUNTER FOR PROCEDURE FOR PURPOSES OTHER THAN REMEDYING HEALTH STATE, UNSPECIFIED: ICD-10-CM

## 2022-08-08 PROCEDURE — OTHER OTHER (COSMETIC): OTHER

## 2022-08-08 PROCEDURE — OTHER MIPS QUALITY: OTHER

## 2022-08-08 ASSESSMENT — LOCATION DETAILED DESCRIPTION DERM
LOCATION DETAILED: RIGHT CENTRAL MALAR CHEEK
LOCATION DETAILED: LEFT INFERIOR CENTRAL MALAR CHEEK

## 2022-08-08 ASSESSMENT — LOCATION ZONE DERM: LOCATION ZONE: FACE

## 2022-08-08 ASSESSMENT — LOCATION SIMPLE DESCRIPTION DERM
LOCATION SIMPLE: RIGHT CHEEK
LOCATION SIMPLE: LEFT CHEEK

## 2022-08-08 NOTE — PROCEDURE: OTHER (COSMETIC)
Other (Free Text): Procedure Note:\\nComedonal Lesions located on the face were removed with a lancet and extractor. Prior to removal the treatment areas were prepped in the usual fashion.\\n\\nAcne Surgery with TheraClear\\n\\nPE: \\n\\nPrior to treatment, the procedure was reviewed in detail, including all but not limited to discomfort with manual extractions, mild erythema and edema immediately following treatment and post care. All questions were answered prior to administering the treatment.\\n\\nTreatment #\\nSite(s):\\n\\nEnergy:\\nVac Level:\\nPulse Mode:\\nPulse Width/ms:\\nTotal Passes:\\n\\nAcne surgery with Lancet and Comedone Extractor: Yes\\nIntralesional Injections with Kenalog preparation: No\\nConcentration: N/A\\nTotal Volume: N/A\\nProducts Applied: Obagi Pore Therapy and Obagi NuDerm Sun Shield SPF 50\\n\\Kim indications, treatment expectations (including management of any possible irritations), protocols, risks and benefits, pre/post care are reviewed. Additional information can be found on consent documentation. Patient understands that multiple treatments may be necessary for optimum results and that on going maintenance with at-home products and additional office visits or treatments may be needed to enhance and extend the desired results.\\n\\nStandard protocol was done. Reviewed with patient that ILS injections as well as manual extractions may be recommended for optimum results. These will be considered prn. Following treatment, the expected mild erythema was observed. Patient tolerated the procedure well without immediate bruising or complication. Post care was reviewed.\\n\\nRTC:
Detail Level: Zone
Other (Free Text): Cosmetic\\n\\nPE:\\n\\nPrior to treatment, all but not limited to treatment indications and expectations(including management of any possible irritations) protocols, risks and benefits were reviewed in detail, including post treatment expectations. All questions were answered prior to administering the treatment.\\n\\nPlan: DermaSilk - Dermaplaning is performed first followed by The Briseida Glow.\\n\\nTreatment #:\\nTreatment Site:\\n\\nCleanse:\\n\\nAcetone:Yes\\n\\nDermaplaning:\\n\\nApplied per protocol utilizing a #10 blade at a 45-degree angle and with a gentle stroking motion.\\n\\nDiamond Glow Specifics:\\nSelected Serum:\\nLot#:\\nExp:\\nGrit Size:\\nApprox PSI:\\n\\nFollowed by Cherry Enzyme Peel occluded with a warm king towel. Once removed a Hydrotant Marine Mask is applied with a warm king towel. Each mask is gently massaged on and removed with in a few minutes or as tolerated.\\n\\nProducts applied:\\n\\nNotes:\\n\\nStandard protocol was applied. All indications, treatment expectations (including management of any possible irritations), protocols, risks and benefits, pre/post care are reviewed. Details of these can be found on the appropriate attached informed consent documentation. Patient understands that multiple treatments may be necessary for optimum results and that on going maintenance with at-home products and additional office visits or treatments may be needed to enhance and extend the desired results. Post care was reviewed.\\n\\Kim questions questions were addressed.\\n\\nRTC:

## 2022-08-08 NOTE — PROCEDURE: MIPS QUALITY
Quality 400a: One-Time Screening For Hepatitis C Virus (Hcv) For All Patients: One-time screening for HCV infection not received within 12 month period and no documentation of prior screening, reasont not given.
Quality 431: Preventive Care And Screening: Unhealthy Alcohol Use - Screening: Patient screened for unhealthy alcohol use using a single question and scores less than 2 times per year
Quality 226: Preventive Care And Screening: Tobacco Use: Screening And Cessation Intervention: Patient screened for tobacco use and is an ex/non-smoker
Quality 110: Preventive Care And Screening: Influenza Immunization: Influenza Immunization previously received during influenza season
Quality 431: Preventive Care And Screening: Unhealthy Alcohol Use - Screening: Patient not identified as an unhealthy alcohol user when screened for unhealthy alcohol use using a systematic screening method
Quality 130: Documentation Of Current Medications In The Medical Record: Current Medications Documented
Detail Level: Detailed

## 2022-08-26 ENCOUNTER — APPOINTMENT (OUTPATIENT)
Dept: URBAN - METROPOLITAN AREA CLINIC 211 | Age: 57
Setting detail: DERMATOLOGY
End: 2022-08-29

## 2022-08-26 DIAGNOSIS — L0390 CELLULITIS AND ABSCESS OF UNSPECIFIED SITES: ICD-10-CM

## 2022-08-26 DIAGNOSIS — L0391 CELLULITIS AND ABSCESS OF UNSPECIFIED SITES: ICD-10-CM

## 2022-08-26 PROBLEM — L02.211 CUTANEOUS ABSCESS OF ABDOMINAL WALL: Status: ACTIVE | Noted: 2022-08-26

## 2022-08-26 PROBLEM — L57.0 ACTINIC KERATOSIS: Status: ACTIVE | Noted: 2022-08-26

## 2022-08-26 PROBLEM — N61.1 ABSCESS OF THE BREAST AND NIPPLE: Status: ACTIVE | Noted: 2022-08-26

## 2022-08-26 PROBLEM — L85.3 XEROSIS CUTIS: Status: ACTIVE | Noted: 2022-08-26

## 2022-08-26 PROCEDURE — OTHER MIPS QUALITY: OTHER

## 2022-08-26 PROCEDURE — OTHER INTRALESIONAL KENALOG: OTHER

## 2022-08-26 PROCEDURE — 11900 INJECT SKIN LESIONS </W 7: CPT

## 2022-08-26 PROCEDURE — OTHER PRESCRIPTION: OTHER

## 2022-08-26 PROCEDURE — OTHER TREATMENT REGIMEN: OTHER

## 2022-08-26 PROCEDURE — 10061 I&D ABSCESS COMP/MULTIPLE: CPT

## 2022-08-26 PROCEDURE — OTHER INCISION AND DRAINAGE: OTHER

## 2022-08-26 PROCEDURE — OTHER COUNSELING: OTHER

## 2022-08-26 RX ORDER — DOXYCYCLINE HYCLATE 100 MG/1
TABLET, COATED ORAL
Qty: 28 | Refills: 0 | Status: ERX | COMMUNITY
Start: 2022-08-26

## 2022-08-26 ASSESSMENT — LOCATION SIMPLE DESCRIPTION DERM
LOCATION SIMPLE: RIGHT BREAST
LOCATION SIMPLE: ABDOMEN
LOCATION SIMPLE: RIGHT BREAST

## 2022-08-26 ASSESSMENT — LOCATION ZONE DERM
LOCATION ZONE: TRUNK
LOCATION ZONE: TRUNK

## 2022-08-26 ASSESSMENT — LOCATION DETAILED DESCRIPTION DERM
LOCATION DETAILED: RIGHT MEDIAL BREAST 4-5:00 REGION
LOCATION DETAILED: RIGHT LATERAL ABDOMEN
LOCATION DETAILED: RIGHT MEDIAL BREAST 4-5:00 REGION

## 2022-08-26 NOTE — HPI: CYST
How Severe Is Your Cyst?: moderate
Is This A New Presentation, Or A Follow-Up?: Cysts
Additional History: Pain level 0/10

## 2022-08-26 NOTE — PROCEDURE: MIPS QUALITY
Detail Level: Detailed
Quality 431: Preventive Care And Screening: Unhealthy Alcohol Use - Screening: Patient not identified as an unhealthy alcohol user when screened for unhealthy alcohol use using a systematic screening method
Quality 431: Preventive Care And Screening: Unhealthy Alcohol Use - Screening: Patient screened for unhealthy alcohol use using a single question and scores less than 2 times per year
Quality 110: Preventive Care And Screening: Influenza Immunization: Influenza Immunization previously received during influenza season
Quality 226: Preventive Care And Screening: Tobacco Use: Screening And Cessation Intervention: Patient screened for tobacco use and is an ex/non-smoker
Quality 130: Documentation Of Current Medications In The Medical Record: Current Medications Documented

## 2022-08-26 NOTE — PROCEDURE: TREATMENT REGIMEN
Detail Level: Zone
Plan: 1. Right breast lesion (I&D). Observe surrounding cellulitis for worsening s discussed, begin doxy today and finish entire course. \\n2. Right abdomen (was not yet fluctuant) injected with Triamcinolone. Encouraged warm compresses if becomes fluctuant as discussed

## 2022-08-26 NOTE — PROCEDURE: INCISION AND DRAINAGE
Packing?: plain packing strips
Dressing: dry sterile dressing
Epidermal Closure: simple interrupted
Consent was obtained and risks were reviewed including but not limited to delayed wound healing, infection, need for multiple I and D's, and pain.
Wound Care: Petrolatum
Render Postcare In Note?: No
I&D Type: complicated
Epidermal Sutures: 4-0 Ethilon
Suture Text: The incision was partially closed with
Curette: Yes
Anesthesia Type: 1% lidocaine with epinephrine
Lesion Type: Abscess
Post-Care Instructions: I reviewed with the patient in detail post-care instructions. Patient should keep wound covered and call the office should any redness, pain, swelling or worsening occur.
Detail Level: Detailed
Method: 11 blade
Anesthesia Volume In Cc: 3
Size Of Lesion In Cm (Optional But May Be Required For Some Insurances): 0
Curette Text (Optional): After the contents were expressed a curette was used to partially remove the cyst wall.

## 2022-09-12 ENCOUNTER — APPOINTMENT (OUTPATIENT)
Dept: URBAN - METROPOLITAN AREA CLINIC 211 | Age: 57
Setting detail: DERMATOLOGY
End: 2022-09-14

## 2022-09-12 DIAGNOSIS — L73.2 HIDRADENITIS SUPPURATIVA: ICD-10-CM

## 2022-09-12 PROCEDURE — OTHER PRESCRIPTION: OTHER

## 2022-09-12 PROCEDURE — OTHER PRESCRIPTION MEDICATION MANAGEMENT: OTHER

## 2022-09-12 PROCEDURE — OTHER MIPS QUALITY: OTHER

## 2022-09-12 PROCEDURE — OTHER COUNSELING: OTHER

## 2022-09-12 PROCEDURE — 99213 OFFICE O/P EST LOW 20 MIN: CPT

## 2022-09-12 PROCEDURE — OTHER REASSURANCE: OTHER

## 2022-09-12 RX ORDER — SARECYCLINE HYDROCHLORIDE 150 MG/1
TABLET, COATED ORAL
Qty: 30 | Refills: 2 | Status: ERX | COMMUNITY
Start: 2022-09-12

## 2022-09-12 ASSESSMENT — LOCATION SIMPLE DESCRIPTION DERM
LOCATION SIMPLE: ABDOMEN
LOCATION SIMPLE: RIGHT BREAST

## 2022-09-12 ASSESSMENT — LOCATION ZONE DERM: LOCATION ZONE: TRUNK

## 2022-09-12 ASSESSMENT — LOCATION DETAILED DESCRIPTION DERM
LOCATION DETAILED: RIGHT MEDIAL BREAST 5-6:00 REGION
LOCATION DETAILED: RIGHT LATERAL ABDOMEN

## 2022-09-12 ASSESSMENT — HURLEY STAGE
IN YOUR EXPERIENCE, AMONG ALL PATIENTS YOU HAVE SEEN WITH THIS CONDITION, HOW SEVERE IS THIS PATIENT'S CONDITION?: HURLEY STAGE II: SINGLE OR MULTIPLE, WIDELY SEPARATED RECURRENT ABSCESSES WITH SINUS TRACT FORMATION AND SCARRING

## 2022-09-12 NOTE — PROCEDURE: PRESCRIPTION MEDICATION MANAGEMENT
Plan: 1. Recommend reducing friction to AA - no abrasive scrubs\\n2. Continue hibiclens and clindamycin wipes daily \\n3. Begin seysara 150mg - take one tablet daily x3 months as a trial\\n4. Reassured her most recent flare is resolving
Detail Level: Zone
Render In Strict Bullet Format?: No

## 2022-09-12 NOTE — HPI: INFECTION (ABSCESS)
How Severe Is It?: moderate
Is This A New Presentation, Or A Follow-Up?: Follow Up Abscess
Additional History: Pain level 0/10

## 2022-09-12 NOTE — PROCEDURE: MIPS QUALITY
Quality 110: Preventive Care And Screening: Influenza Immunization: Influenza Immunization previously received during influenza season
Quality 400a: One-Time Screening For Hepatitis C Virus (Hcv) For All Patients: Patient received one-time screening for HCV infection
Quality 130: Documentation Of Current Medications In The Medical Record: Current Medications Documented
Quality 402: Tobacco Use And Help With Quitting Among Adolescents: Patient screened for tobacco and is an ex-smoker
Quality 226: Preventive Care And Screening: Tobacco Use: Screening And Cessation Intervention: Patient screened for tobacco use and is an ex/non-smoker
Detail Level: Detailed
Quality 431: Preventive Care And Screening: Unhealthy Alcohol Use - Screening: Patient not identified as an unhealthy alcohol user when screened for unhealthy alcohol use using a systematic screening method

## 2022-09-19 ENCOUNTER — APPOINTMENT (OUTPATIENT)
Dept: URBAN - METROPOLITAN AREA CLINIC 211 | Age: 57
Setting detail: DERMATOLOGY
End: 2022-09-19

## 2022-09-19 ENCOUNTER — APPOINTMENT (OUTPATIENT)
Dept: URBAN - METROPOLITAN AREA CLINIC 211 | Age: 57
Setting detail: DERMATOLOGY
End: 2022-09-20

## 2022-09-19 DIAGNOSIS — L81.4 OTHER MELANIN HYPERPIGMENTATION: ICD-10-CM

## 2022-09-19 DIAGNOSIS — Z85.828 PERSONAL HISTORY OF OTHER MALIGNANT NEOPLASM OF SKIN: ICD-10-CM

## 2022-09-19 DIAGNOSIS — D18.0 HEMANGIOMA: ICD-10-CM

## 2022-09-19 DIAGNOSIS — L73.2 HIDRADENITIS SUPPURATIVA: ICD-10-CM

## 2022-09-19 DIAGNOSIS — D22 MELANOCYTIC NEVI: ICD-10-CM

## 2022-09-19 DIAGNOSIS — L82.0 INFLAMED SEBORRHEIC KERATOSIS: ICD-10-CM

## 2022-09-19 DIAGNOSIS — L82.1 OTHER SEBORRHEIC KERATOSIS: ICD-10-CM

## 2022-09-19 DIAGNOSIS — Z71.89 OTHER SPECIFIED COUNSELING: ICD-10-CM

## 2022-09-19 DIAGNOSIS — Z41.9 ENCOUNTER FOR PROCEDURE FOR PURPOSES OTHER THAN REMEDYING HEALTH STATE, UNSPECIFIED: ICD-10-CM

## 2022-09-19 PROBLEM — D22.5 MELANOCYTIC NEVI OF TRUNK: Status: ACTIVE | Noted: 2022-09-19

## 2022-09-19 PROBLEM — D18.01 HEMANGIOMA OF SKIN AND SUBCUTANEOUS TISSUE: Status: ACTIVE | Noted: 2022-09-19

## 2022-09-19 PROCEDURE — OTHER REASSURANCE: OTHER

## 2022-09-19 PROCEDURE — OTHER LIQUID NITROGEN: OTHER

## 2022-09-19 PROCEDURE — 99213 OFFICE O/P EST LOW 20 MIN: CPT | Mod: 25

## 2022-09-19 PROCEDURE — OTHER MIPS QUALITY: OTHER

## 2022-09-19 PROCEDURE — OTHER SUNSCREEN RECOMMENDATIONS: OTHER

## 2022-09-19 PROCEDURE — OTHER OBSERVATION: OTHER

## 2022-09-19 PROCEDURE — OTHER OTHER: OTHER

## 2022-09-19 PROCEDURE — 17110 DESTRUCT B9 LESION 1-14: CPT

## 2022-09-19 PROCEDURE — OTHER PRESCRIPTION MEDICATION MANAGEMENT: OTHER

## 2022-09-19 PROCEDURE — OTHER COUNSELING: OTHER

## 2022-09-19 ASSESSMENT — LOCATION DETAILED DESCRIPTION DERM
LOCATION DETAILED: RIGHT MID PREAURICULAR CHEEK
LOCATION DETAILED: RIGHT PROXIMAL DORSAL FOREARM
LOCATION DETAILED: RIGHT SUPERIOR FRONTAL SCALP
LOCATION DETAILED: PERIUMBILICAL SKIN
LOCATION DETAILED: LEFT DISTAL PRETIBIAL REGION
LOCATION DETAILED: RIGHT DISTAL PRETIBIAL REGION
LOCATION DETAILED: RIGHT MID-UPPER BACK
LOCATION DETAILED: LEFT PROXIMAL DORSAL FOREARM
LOCATION DETAILED: LEFT INFERIOR MEDIAL UPPER BACK
LOCATION DETAILED: LEFT LATERAL SUPERIOR CHEST

## 2022-09-19 ASSESSMENT — LOCATION ZONE DERM
LOCATION ZONE: TRUNK
LOCATION ZONE: SCALP
LOCATION ZONE: FACE
LOCATION ZONE: LEG
LOCATION ZONE: ARM

## 2022-09-19 ASSESSMENT — LOCATION SIMPLE DESCRIPTION DERM
LOCATION SIMPLE: CHEST
LOCATION SIMPLE: LEFT PRETIBIAL REGION
LOCATION SIMPLE: RIGHT PRETIBIAL REGION
LOCATION SIMPLE: SCALP
LOCATION SIMPLE: RIGHT UPPER BACK
LOCATION SIMPLE: LEFT UPPER BACK
LOCATION SIMPLE: RIGHT FOREARM
LOCATION SIMPLE: LEFT FOREARM
LOCATION SIMPLE: ABDOMEN
LOCATION SIMPLE: RIGHT CHEEK

## 2022-09-19 ASSESSMENT — HURLEY STAGE
IN YOUR EXPERIENCE, AMONG ALL PATIENTS YOU HAVE SEEN WITH THIS CONDITION, HOW SEVERE IS THIS PATIENT'S CONDITION?: HURLEY STAGE I: ABSCESS FORMATION (SINGLE OR MULTIPLE) WITHOUT SINUS TRACTS OR SCARRING

## 2022-09-19 NOTE — HPI: OTHER
Condition:: Cosmetic
Please Describe Your Condition:: Patient presents for dermasilk to full face and Theraclear to the axilla and upper abdomen.  Patient denies any different concerns today.  Medications, allergies and medical hx were reviewed.

## 2022-09-19 NOTE — PROCEDURE: MIPS QUALITY
Detail Level: Detailed
Quality 402: Tobacco Use And Help With Quitting Among Adolescents: Patient screened for tobacco and is an ex-smoker
Quality 130: Documentation Of Current Medications In The Medical Record: Current Medications Documented
Quality 226: Preventive Care And Screening: Tobacco Use: Screening And Cessation Intervention: Patient screened for tobacco use and is an ex/non-smoker
Quality 400a: One-Time Screening For Hepatitis C Virus (Hcv) For All Patients: One-time screening for HCV infection not received within 12 month period and no documentation of prior screening, reasont not given.
Quality 431: Preventive Care And Screening: Unhealthy Alcohol Use - Screening: Patient not identified as an unhealthy alcohol user when screened for unhealthy alcohol use using a systematic screening method
Quality 110: Preventive Care And Screening: Influenza Immunization: Influenza Immunization previously received during influenza season

## 2022-09-19 NOTE — PROCEDURE: LIQUID NITROGEN
Detail Level: Detailed
Show Topical Anesthesia Variable?: Yes
Consent: The patient's consent was obtained including but not limited to risks of crusting, scabbing, blistering, scarring, darker or lighter pigmentary change, recurrence, incomplete removal and infection.
Post-Care Instructions: I reviewed with the patient in detail post-care instructions. Patient is to wear sunprotection, and avoid picking at any of the treated lesions. Pt may apply Vaseline to crusted or scabbing areas.
Render Post-Care Instructions In Note?: no
Spray Paint Text: The liquid nitrogen was applied to the skin utilizing a spray paint frosting technique.
Medical Necessity Clause: This procedure was medically necessary because the lesions that were treated were:
Medical Necessity Information: It is in your best interest to select a reason for this procedure from the list below. All of these items fulfill various CMS LCD requirements except the new and changing color options.

## 2022-09-19 NOTE — PROCEDURE: PRESCRIPTION MEDICATION MANAGEMENT
Detail Level: Zone
Render In Strict Bullet Format?: No
Plan: 1. Continue Seysara daily\\n2.  Continue extractions, ILK as desired (seeing Alejandrina for Acleara)

## 2022-09-19 NOTE — PROCEDURE: OTHER
Note Text (......Xxx Chief Complaint.): This diagnosis correlates with the
Render Risk Assessment In Note?: no
Detail Level: Zone
Other (Free Text): Procedure Note:\\nComedonal Lesions located on the face were removed with a lancet and extractor. Prior to removal the treatment areas were prepped in the usual fashion.\\n\\nAcne Surgery with TheraClear\\n\\nPE: \\n\\nPrior to treatment, the procedure was reviewed in detail, including all but not limited to discomfort with manual extractions, mild erythema and edema immediately following treatment and post care. All questions were answered prior to administering the treatment.\\n\\nTreatment #\\nSite(s):\\n\\nEnergy:\\nVac Level:\\nPulse Mode:\\nPulse Width/ms:\\nTotal Passes:\\n\\nAcne surgery with Lancet and Comedone Extractor: Yes\\nIntralesional Injections with Kenalog preparation: No\\nConcentration: N/A\\nTotal Volume: N/A\\nProducts Applied: Obagi Pore Therapy and Obagi NuDerm Sun Shield SPF 50\\n\\Robert indications, treatment expectations (including management of any possible irritations), protocols, risks and benefits, pre/post care are reviewed. Additional information can be found on consent documentation. Patient understands that multiple treatments may be necessary for optimum results and that on going maintenance with at-home products and additional office visits or treatments may be needed to enhance and extend the desired results.\\n\\nStandard protocol was done. Reviewed with patient that ILS injections as well as manual extractions may be recommended for optimum results. These will be considered prn. Following treatment, the expected mild erythema was observed. Patient tolerated the procedure well without immediate bruising or complication. Post care was reviewed.\\n\\nRTC:
Other (Free Text): Cosmetic\\n\\nPE:\\n\\nPrior to treatment, all but not limited to treatment indications and expectations(including management of any possible irritations) protocols, risks and benefits were reviewed in detail, including post treatment expectations. All questions were answered prior to administering the treatment.\\n\\nPlan: DermaSilk - Dermaplaning is performed first followed by [facility].\\n\\nTreatment #:\\nTreatment Site:\\n\\nCleanse:\\n\\nAcetone:Yes\\n\\nDermaplaning:\\n\\nApplied per protocol utilizing a #10 blade at a 45-degree angle and with a gentle stroking motion.\\n\\nDiamond Glow Specifics:\\nSelected Serum:\\nLot#:\\nExp:\\nGrit Size:\\nApprox PSI:\\n\\nFollowed by Cherry Enzyme Peel occluded with a warm alves towel. Once removed a Hydrotant Marine Mask is applied with a warm alves towel. Each mask is gently massaged on and removed with in a few minutes or as tolerated.\\n\\nProducts applied:\\n\\nNotes:\\n\\nStandard protocol was applied. All indications, treatment expectations (including management of any possible irritations), protocols, risks and benefits, pre/post care are reviewed. Details of these can be found on the appropriate attached informed consent documentation. Patient understands that multiple treatments may be necessary for optimum results and that on going maintenance with at-home products and additional office visits or treatments may be needed to enhance and extend the desired results. Post care was reviewed.\\n\\Robert questions questions were addressed.\\n\\nRTC:

## 2022-10-31 ENCOUNTER — APPOINTMENT (OUTPATIENT)
Dept: URBAN - METROPOLITAN AREA CLINIC 211 | Age: 57
Setting detail: DERMATOLOGY
End: 2022-10-31

## 2022-10-31 DIAGNOSIS — Z41.9 ENCOUNTER FOR PROCEDURE FOR PURPOSES OTHER THAN REMEDYING HEALTH STATE, UNSPECIFIED: ICD-10-CM

## 2022-10-31 PROCEDURE — OTHER MIPS QUALITY: OTHER

## 2022-10-31 PROCEDURE — OTHER OTHER: OTHER

## 2022-10-31 NOTE — PROCEDURE: OTHER
Other (Free Text): Procedure Note:\\nComedonal Lesions located on the face were removed with a lancet and extractor. Prior to removal the treatment areas were prepped in the usual fashion.\\n\\nAcne Surgery with TheraClear\\n\\nPE: \\n\\nPrior to treatment, the procedure was reviewed in detail, including all but not limited to discomfort with manual extractions, mild erythema and edema immediately following treatment and post care. All questions were answered prior to administering the treatment.\\n\\nTreatment #\\nSite(s):\\n\\nEnergy:\\nVac Level:\\nPulse Mode:\\nPulse Width/ms:\\nTotal Passes:\\n\\nAcne surgery with Lancet and Comedone Extractor: Yes\\nIntralesional Injections with Kenalog preparation: No\\nConcentration: N/A\\nTotal Volume: N/A\\nProducts Applied: Obagi Pore Therapy and Obagi NuDerm Sun Shield SPF 50\\n\\Robert indications, treatment expectations (including management of any possible irritations), protocols, risks and benefits, pre/post care are reviewed. Additional information can be found on consent documentation. Patient understands that multiple treatments may be necessary for optimum results and that on going maintenance with at-home products and additional office visits or treatments may be needed to enhance and extend the desired results.\\n\\nStandard protocol was done. Reviewed with patient that ILS injections as well as manual extractions may be recommended for optimum results. These will be considered prn. Following treatment, the expected mild erythema was observed. Patient tolerated the procedure well without immediate bruising or complication. Post care was reviewed.\\n\\nRTC:
Note Text (......Xxx Chief Complaint.): This diagnosis correlates with the
Render Risk Assessment In Note?: no
Detail Level: Zone

## 2022-10-31 NOTE — HPI: OTHER
Condition:: Cosmetic
Please Describe Your Condition:: is an established patient who is being seen for a chief complaint of Cosmetic . Acne surgery/ TheraClear only. She doesn't have any other concerns at this time.

## 2022-10-31 NOTE — PROCEDURE: MIPS QUALITY
Quality 130: Documentation Of Current Medications In The Medical Record: Current Medications Documented
Quality 431: Preventive Care And Screening: Unhealthy Alcohol Use - Screening: Patient not identified as an unhealthy alcohol user when screened for unhealthy alcohol use using a systematic screening method
Quality 402: Tobacco Use And Help With Quitting Among Adolescents: Patient screened for tobacco and is an ex-smoker
Quality 226: Preventive Care And Screening: Tobacco Use: Screening And Cessation Intervention: Patient screened for tobacco use and is an ex/non-smoker
Quality 400a: One-Time Screening For Hepatitis C Virus (Hcv) For All Patients: Patient received one-time screening for HCV infection
Quality 110: Preventive Care And Screening: Influenza Immunization: Influenza Immunization previously received during influenza season
Detail Level: Detailed

## 2023-01-30 ENCOUNTER — APPOINTMENT (OUTPATIENT)
Dept: URBAN - METROPOLITAN AREA CLINIC 211 | Age: 58
Setting detail: DERMATOLOGY
End: 2023-01-30

## 2023-01-30 ENCOUNTER — APPOINTMENT (OUTPATIENT)
Dept: URBAN - METROPOLITAN AREA CLINIC 211 | Age: 58
Setting detail: DERMATOLOGY
End: 2023-02-02

## 2023-01-30 DIAGNOSIS — L82.0 INFLAMED SEBORRHEIC KERATOSIS: ICD-10-CM

## 2023-01-30 DIAGNOSIS — L91.8 OTHER HYPERTROPHIC DISORDERS OF THE SKIN: ICD-10-CM

## 2023-01-30 DIAGNOSIS — Z41.9 ENCOUNTER FOR PROCEDURE FOR PURPOSES OTHER THAN REMEDYING HEALTH STATE, UNSPECIFIED: ICD-10-CM

## 2023-01-30 DIAGNOSIS — L98.8 OTHER SPECIFIED DISORDERS OF THE SKIN AND SUBCUTANEOUS TISSUE: ICD-10-CM

## 2023-01-30 PROCEDURE — OTHER LIQUID NITROGEN: OTHER

## 2023-01-30 PROCEDURE — OTHER COUNSELING: OTHER

## 2023-01-30 PROCEDURE — OTHER MIPS QUALITY: OTHER

## 2023-01-30 PROCEDURE — OTHER BENIGN DESTRUCTION COSMETIC: OTHER

## 2023-01-30 PROCEDURE — 17110 DESTRUCT B9 LESION 1-14: CPT

## 2023-01-30 PROCEDURE — OTHER BOTOX: OTHER

## 2023-01-30 PROCEDURE — OTHER OTHER: OTHER

## 2023-01-30 ASSESSMENT — LOCATION ZONE DERM
LOCATION ZONE: FACE
LOCATION ZONE: TRUNK
LOCATION ZONE: NECK
LOCATION ZONE: ARM
LOCATION ZONE: TRUNK
LOCATION ZONE: FACE
LOCATION ZONE: ARM
LOCATION ZONE: NECK

## 2023-01-30 ASSESSMENT — LOCATION DETAILED DESCRIPTION DERM
LOCATION DETAILED: LEFT SUPERIOR CENTRAL BUCCAL CHEEK
LOCATION DETAILED: RIGHT CLAVICULAR SKIN
LOCATION DETAILED: LEFT INFERIOR LATERAL NECK
LOCATION DETAILED: RIGHT INFERIOR LATERAL NECK
LOCATION DETAILED: LEFT ANTERIOR SHOULDER
LOCATION DETAILED: LEFT INFERIOR LATERAL NECK
LOCATION DETAILED: LEFT CLAVICULAR SKIN
LOCATION DETAILED: RIGHT ANTERIOR SHOULDER
LOCATION DETAILED: LEFT CLAVICULAR NECK
LOCATION DETAILED: LEFT INFERIOR CENTRAL MALAR CHEEK
LOCATION DETAILED: RIGHT CLAVICULAR NECK
LOCATION DETAILED: LEFT SUPERIOR CENTRAL MALAR CHEEK
LOCATION DETAILED: RIGHT SUPERIOR CENTRAL MALAR CHEEK
LOCATION DETAILED: LEFT SUPERIOR CENTRAL MALAR CHEEK
LOCATION DETAILED: RIGHT INFERIOR LATERAL NECK
LOCATION DETAILED: GLABELLA
LOCATION DETAILED: RIGHT SUPERIOR LATERAL MALAR CHEEK
LOCATION DETAILED: LEFT CLAVICULAR NECK
LOCATION DETAILED: LEFT INFERIOR ANTERIOR NECK
LOCATION DETAILED: STERNAL NOTCH
LOCATION DETAILED: RIGHT CLAVICULAR NECK
LOCATION DETAILED: LEFT INFERIOR ANTERIOR NECK

## 2023-01-30 ASSESSMENT — PAIN INTENSITY VAS
HOW INTENSE IS YOUR PAIN 0 BEING NO PAIN, 10 BEING THE MOST SEVERE PAIN POSSIBLE?: NO PAIN
HOW INTENSE IS YOUR PAIN 0 BEING NO PAIN, 10 BEING THE MOST SEVERE PAIN POSSIBLE?: NO PAIN

## 2023-01-30 ASSESSMENT — LOCATION SIMPLE DESCRIPTION DERM
LOCATION SIMPLE: RIGHT CHEEK
LOCATION SIMPLE: CHEST
LOCATION SIMPLE: LEFT CLAVICULAR SKIN
LOCATION SIMPLE: RIGHT ANTERIOR NECK
LOCATION SIMPLE: RIGHT CLAVICULAR SKIN
LOCATION SIMPLE: LEFT CHEEK
LOCATION SIMPLE: GLABELLA
LOCATION SIMPLE: LEFT SHOULDER
LOCATION SIMPLE: LEFT ANTERIOR NECK
LOCATION SIMPLE: RIGHT ANTERIOR NECK
LOCATION SIMPLE: RIGHT SHOULDER
LOCATION SIMPLE: RIGHT CHEEK
LOCATION SIMPLE: LEFT CHEEK
LOCATION SIMPLE: LEFT ANTERIOR NECK

## 2023-01-30 NOTE — PROCEDURE: BENIGN DESTRUCTION COSMETIC
Price (Use Numbers Only, No Special Characters Or $): 835.54 Price (Use Numbers Only, No Special Characters Or $): 305.63

## 2023-01-30 NOTE — PROCEDURE: BOTOX
Show Mentalis Units: No
Incrementing Botox Units: By 0.5 Units
Additional Area 1 Location: Forehead/Glabella/CF
Left Periorbital Units: 0
Show Topical Anesthesia: Yes
Additional Area 1 Units: 30
Dilution (U/0.1 Cc): 4
Detail Level: Detailed
Forehead Units: 6
Glabellar Complex Units: 12
Show Inventory Tab: Show
Consent: Written consent obtained. Risks include but not limited to lid/brow ptosis, bruising, swelling, diplopia, temporary effect, incomplete chemical denervation.
Post-Care Instructions: Patient instructed to not lie down for 4 hours and limit physical activity for 24 hours.

## 2023-01-30 NOTE — PROCEDURE: LIQUID NITROGEN
Post-Care Instructions: I reviewed with the patient in detail post-care instructions. Patient is to wear sunprotection, and avoid picking at any of the treated lesions. Pt may apply Vaseline to crusted or scabbing areas.
Include Z78.9 (Other Specified Conditions Influencing Health Status) As An Associated Diagnosis?: No
Show Spray Paint Technique Variable?: Yes
Detail Level: Detailed
Spray Paint Text: The liquid nitrogen was applied to the skin utilizing a spray paint frosting technique.
Consent: The patient's consent was obtained including but not limited to risks of crusting, scabbing, blistering, scarring, darker or lighter pigmentary change, recurrence, incomplete removal and infection.
Medical Necessity Clause: This procedure was medically necessary because the lesions that were treated were:
Medical Necessity Information: It is in your best interest to select a reason for this procedure from the list below. All of these items fulfill various CMS LCD requirements except the new and changing color options.

## 2023-01-30 NOTE — HPI: OTHER
Condition:: Acne surgery and Dermasilk Facial
Please Describe Your Condition:: Patient presents for second treatment. Medications, allergies and medical history reviewed. Questions and concerns addressed.

## 2023-01-30 NOTE — HPI: COSMETIC (BOTOX)
Have You Had Botox Before?: has had botox
Additional History: Pain level 0/10
When Was Your Last Botox Treatment?: 2019

## 2023-01-30 NOTE — PROCEDURE: BENIGN DESTRUCTION COSMETIC
Price (Use Numbers Only, No Special Characters Or $): 255.07 Price (Use Numbers Only, No Special Characters Or $): 594.30

## 2023-01-30 NOTE — PROCEDURE: OTHER
Note Text (......Xxx Chief Complaint.): This diagnosis correlates with the
Other (Free Text): Procedure Note:\\nComedonal Lesions located on the face were removed with a lancet and extractor. Prior to removal the treatment areas were prepped in the usual fashion.\\n\\nAcne Surgery with TheraClear\\n\\nPE: \\n\\nPrior to treatment, the procedure was reviewed in detail, including all but not limited to discomfort with manual extractions, mild erythema and edema immediately following treatment and post care. All questions were answered prior to administering the treatment.\\n\\nTreatment #\\nSite(s):\\n\\nEnergy:\\nVac Level:\\nPulse Mode:\\nPulse Width/ms:\\nTotal Passes:\\n\\nAcne surgery with Lancet and Comedone Extractor: Yes\\nIntralesional Injections with Kenalog preparation: No\\nConcentration: N/A\\nTotal Volume: N/A\\nProducts Applied: Obagi Pore Therapy and Obagi NuDerm Sun Shield SPF 50\\n\\Robert indications, treatment expectations (including management of any possible irritations), protocols, risks and benefits, pre/post care are reviewed. Additional information can be found on consent documentation. Patient understands that multiple treatments may be necessary for optimum results and that on going maintenance with at-home products and additional office visits or treatments may be needed to enhance and extend the desired results.\\n\\nStandard protocol was done. Reviewed with patient that ILS injections as well as manual extractions may be recommended for optimum results. These will be considered prn. Following treatment, the expected mild erythema was observed. Patient tolerated the procedure well without immediate bruising or complication. Post care was reviewed.\\n\\nRTC:
Render Risk Assessment In Note?: no
Detail Level: Zone
Other (Free Text): Cosmetic\\n\\nPE:\\n\\nPrior to treatment, all but not limited to treatment indications and expectations(including management of any possible irritations) protocols, risks and benefits were reviewed in detail, including post treatment expectations. All questions were answered prior to administering the treatment.\\n\\nPlan: DermaSilk - Dermaplaning is performed first followed by [facility].\\n\\nTreatment #:\\nTreatment Site:\\n\\nCleanse:\\n\\nAcetone:Yes\\n\\nDermaplaning:\\n\\nApplied per protocol utilizing a #10 blade at a 45-degree angle and with a gentle stroking motion.\\n\\nDiamond Glow Specifics:\\nSelected Serum:\\nLot#:\\nExp:\\nGrit Size:\\nApprox PSI:\\n\\nFollowed by Cherry Enzyme Peel occluded with a warm alves towel. Once removed a Hydrotant Marine Mask is applied with a warm alves towel. Each mask is gently massaged on and removed with in a few minutes or as tolerated.\\n\\nProducts applied:\\n\\nNotes:\\n\\nStandard protocol was applied. All indications, treatment expectations (including management of any possible irritations), protocols, risks and benefits, pre/post care are reviewed. Details of these can be found on the appropriate attached informed consent documentation. Patient understands that multiple treatments may be necessary for optimum results and that on going maintenance with at-home products and additional office visits or treatments may be needed to enhance and extend the desired results. Post care was reviewed.\\n\\Robert questions questions were addressed.\\n\\nRTC:

## 2023-01-30 NOTE — PROCEDURE: MIPS QUALITY
Quality 226: Preventive Care And Screening: Tobacco Use: Screening And Cessation Intervention: Patient screened for tobacco use and is an ex/non-smoker
Detail Level: Detailed
Quality 110: Preventive Care And Screening: Influenza Immunization: Influenza Immunization previously received during influenza season
Quality 130: Documentation Of Current Medications In The Medical Record: Current Medications Documented
Quality 431: Preventive Care And Screening: Unhealthy Alcohol Use - Screening: Patient not identified as an unhealthy alcohol user when screened for unhealthy alcohol use using a systematic screening method
Quality 400a: One-Time Screening For Hepatitis C Virus (Hcv) For All Patients: Patient received one-time screening for HCV infection

## 2023-01-30 NOTE — PROCEDURE: LIQUID NITROGEN
Post-Care Instructions: I reviewed with the patient in detail post-care instructions. Patient is to wear sunprotection, and avoid picking at any of the treated lesions. Pt may apply Vaseline to crusted or scabbing areas.
Detail Level: Detailed
Spray Paint Text: The liquid nitrogen was applied to the skin utilizing a spray paint frosting technique.
Show Applicator Variable?: Yes
Add 52 Modifier (Optional): no
Consent: The patient's consent was obtained including but not limited to risks of crusting, scabbing, blistering, scarring, darker or lighter pigmentary change, recurrence, incomplete removal and infection.
Medical Necessity Information: It is in your best interest to select a reason for this procedure from the list below. All of these items fulfill various CMS LCD requirements except the new and changing color options.
Medical Necessity Clause: This procedure was medically necessary because the lesions that were treated were:

## 2023-01-30 NOTE — HPI: SKIN LESION (SKIN TAGS)
How Severe Are They?: mild
Is This A New Presentation, Or A Follow-Up?: Skin Lesions
Additional History: Pain level 0/10

## 2023-02-20 ENCOUNTER — APPOINTMENT (OUTPATIENT)
Dept: URBAN - METROPOLITAN AREA CLINIC 211 | Age: 58
Setting detail: DERMATOLOGY
End: 2023-02-28

## 2023-02-20 DIAGNOSIS — L98.8 OTHER SPECIFIED DISORDERS OF THE SKIN AND SUBCUTANEOUS TISSUE: ICD-10-CM

## 2023-02-20 PROCEDURE — OTHER COUNSELING: OTHER

## 2023-02-20 PROCEDURE — OTHER BOTOX: OTHER

## 2023-02-20 PROCEDURE — OTHER MIPS QUALITY: OTHER

## 2023-02-20 ASSESSMENT — LOCATION ZONE DERM: LOCATION ZONE: FACE

## 2023-02-20 ASSESSMENT — LOCATION SIMPLE DESCRIPTION DERM: LOCATION SIMPLE: LEFT TEMPLE

## 2023-02-20 ASSESSMENT — LOCATION DETAILED DESCRIPTION DERM: LOCATION DETAILED: LEFT INFERIOR TEMPLE

## 2023-02-20 NOTE — PROCEDURE: BOTOX
L Brow Units: 0
Show Levator Superior Units: Yes
Show Ucl Units: No
Consent: Written consent obtained. Risks include but not limited to lid/brow ptosis, bruising, swelling, diplopia, temporary effect, incomplete chemical denervation.
Post-Care Instructions: Patient instructed to not lie down for 4 hours and limit physical activity for 24 hours.
Incrementing Botox Units: By 0.5 Units
Dilution (U/0.1 Cc): 4
Additional Area 1 Location: Left CF
Additional Area 1 Units: 2
Detail Level: Detailed
Show Inventory Tab: Show

## 2023-03-20 ENCOUNTER — APPOINTMENT (OUTPATIENT)
Dept: URBAN - METROPOLITAN AREA CLINIC 211 | Age: 58
Setting detail: DERMATOLOGY
End: 2023-03-20

## 2023-03-20 DIAGNOSIS — Z41.9 ENCOUNTER FOR PROCEDURE FOR PURPOSES OTHER THAN REMEDYING HEALTH STATE, UNSPECIFIED: ICD-10-CM

## 2023-03-20 PROCEDURE — OTHER OTHER: OTHER

## 2023-03-20 PROCEDURE — OTHER MIPS QUALITY: OTHER

## 2023-03-20 NOTE — PROCEDURE: OTHER
Render Risk Assessment In Note?: no
Other (Free Text): Procedure Note:\\nComedonal Lesions located on the face were removed with a lancet and extractor. Prior to removal the treatment areas were prepped in the usual fashion.\\n\\nAcne Surgery with TheraClear\\n\\nPE: \\n\\nPrior to treatment, the procedure was reviewed in detail, including all but not limited to discomfort with manual extractions, mild erythema and edema immediately following treatment and post care. All questions were answered prior to administering the treatment.\\n\\nTreatment #\\nSite(s):\\n\\nEnergy:\\nVac Level:\\nPulse Mode:\\nPulse Width/ms:\\nTotal Passes:\\n\\nAcne surgery with Lancet and Comedone Extractor: Yes\\nIntralesional Injections with Kenalog preparation: No\\nConcentration: N/A\\nTotal Volume: N/A\\nProducts Applied: Obagi Pore Therapy and Obagi NuDerm Sun Shield SPF 50\\n\\Kim indications, treatment expectations (including management of any possible irritations), protocols, risks and benefits, pre/post care are reviewed. Additional information can be found on consent documentation. Patient understands that multiple treatments may be necessary for optimum results and that on going maintenance with at-home products and additional office visits or treatments may be needed to enhance and extend the desired results.\\n\\nStandard protocol was done. Reviewed with patient that ILS injections as well as manual extractions may be recommended for optimum results. These will be considered prn. Following treatment, the expected mild erythema was observed. Patient tolerated the procedure well without immediate bruising or complication. Post care was reviewed.\\n\\nRTC:
Detail Level: Zone
Note Text (......Xxx Chief Complaint.): This diagnosis correlates with the

## 2023-03-20 NOTE — HPI: OTHER
Condition:: Cosmetic
Please Describe Your Condition:: Patient is here for acne surgery. Has no other questions or concerns. \\n\\nPain 0/10

## 2023-04-10 ENCOUNTER — APPOINTMENT (OUTPATIENT)
Dept: URBAN - METROPOLITAN AREA CLINIC 211 | Age: 58
Setting detail: DERMATOLOGY
End: 2023-04-10

## 2023-04-10 DIAGNOSIS — Z41.9 ENCOUNTER FOR PROCEDURE FOR PURPOSES OTHER THAN REMEDYING HEALTH STATE, UNSPECIFIED: ICD-10-CM

## 2023-04-10 PROCEDURE — OTHER MIPS QUALITY: OTHER

## 2023-04-10 PROCEDURE — OTHER OTHER: OTHER

## 2023-04-10 NOTE — PROCEDURE: OTHER
Detail Level: Zone
Render Risk Assessment In Note?: no
Note Text (......Xxx Chief Complaint.): This diagnosis correlates with the
Other (Free Text): Procedure Note:\\nComedonal Lesions located on the face were removed with a lancet and extractor. Prior to removal the treatment areas were prepped in the usual fashion.\\n\\nAcne Surgery with TheraClear\\n\\nPE: \\n\\nPrior to treatment, the procedure was reviewed in detail, including all but not limited to discomfort with manual extractions, mild erythema and edema immediately following treatment and post care. All questions were answered prior to administering the treatment.\\n\\nTreatment #\\nSite(s):\\n\\nEnergy:\\nVac Level:\\nPulse Mode:\\nPulse Width/ms:\\nTotal Passes:\\n\\nAcne surgery with Lancet and Comedone Extractor: Yes\\nIntralesional Injections with Kenalog preparation: No\\nConcentration: N/A\\nTotal Volume: N/A\\nProducts Applied: Obagi Pore Therapy and Obagi NuDerm Sun Shield SPF 50\\n\\Kim indications, treatment expectations (including management of any possible irritations), protocols, risks and benefits, pre/post care are reviewed. Additional information can be found on consent documentation. Patient understands that multiple treatments may be necessary for optimum results and that on going maintenance with at-home products and additional office visits or treatments may be needed to enhance and extend the desired results.\\n\\nStandard protocol was done. Reviewed with patient that ILS injections as well as manual extractions may be recommended for optimum results. These will be considered prn. Following treatment, the expected mild erythema was observed. Patient tolerated the procedure well without immediate bruising or complication. Post care was reviewed.\\n\\nRTC:
Other (Free Text): Cosmetic\\n\\nPE:\\n\\nPrior to treatment, all but not limited to treatment indications and expectations(including management of any possible irritations) protocols, risks and benefits were reviewed in detail, including post treatment expectations. All questions were answered prior to administering the treatment.\\n\\nPlan: DermaSilk - Dermaplaning is performed first followed by [facility].\\n\\nTreatment #:\\nTreatment Site:\\n\\nCleanse:\\n\\nAcetone:Yes\\n\\nDermaplaning:\\n\\nApplied per protocol utilizing a #10 blade at a 45-degree angle and with a gentle stroking motion.\\n\\nDiamond Glow Specifics:\\nSelected Serum:\\nLot#:\\nExp:\\nGrit Size:\\nApprox PSI:\\n\\nFollowed by Cherry Enzyme Peel occluded with a warm king towel. Once removed a Hydrotant Marine Mask is applied with a warm king towel. Each mask is gently massaged on and removed with in a few minutes or as tolerated.\\n\\nProducts applied:\\n\\nNotes:\\n\\nStandard protocol was applied. All indications, treatment expectations (including management of any possible irritations), protocols, risks and benefits, pre/post care are reviewed. Details of these can be found on the appropriate attached informed consent documentation. Patient understands that multiple treatments may be necessary for optimum results and that on going maintenance with at-home products and additional office visits or treatments may be needed to enhance and extend the desired results. Post care was reviewed.\\n\\Kim questions questions were addressed.\\n\\nRTC:

## 2023-06-05 ENCOUNTER — APPOINTMENT (OUTPATIENT)
Dept: URBAN - METROPOLITAN AREA CLINIC 211 | Age: 58
Setting detail: DERMATOLOGY
End: 2023-06-05

## 2023-06-05 DIAGNOSIS — Z41.9 ENCOUNTER FOR PROCEDURE FOR PURPOSES OTHER THAN REMEDYING HEALTH STATE, UNSPECIFIED: ICD-10-CM

## 2023-06-05 PROCEDURE — OTHER OTHER: OTHER

## 2023-06-05 NOTE — PROCEDURE: OTHER
Note Text (......Xxx Chief Complaint.): This diagnosis correlates with the
Detail Level: Zone
Other (Free Text): Cosmetic\\n\\nPE:\\n\\nPrior to treatment, all but not limited to treatment indications and expectations(including management of any possible irritations) protocols, risks and benefits were reviewed in detail, including post treatment expectations. All questions were answered prior to administering the treatment.\\n\\nPlan: DermaSilk - Dermaplaning is performed first followed by [facility].\\n\\nTreatment #:\\nTreatment Site:\\n\\nCleanse:\\n\\nAcetone:Yes\\n\\nDermaplaning:\\n\\nApplied per protocol utilizing a #10 blade at a 45-degree angle and with a gentle stroking motion.\\n\\nDiamond Glow Specifics:\\nSelected Serum:\\nLot#:\\nExp:\\nGrit Size:\\nApprox PSI:\\n\\nFollowed by Cherry Enzyme Peel occluded with a warm king towel. Once removed a Hydrotant Marine Mask is applied with a warm king towel. Each mask is gently massaged on and removed with in a few minutes or as tolerated.\\n\\nProducts applied:\\n\\nNotes:\\n\\nStandard protocol was applied. All indications, treatment expectations (including management of any possible irritations), protocols, risks and benefits, pre/post care are reviewed. Details of these can be found on the appropriate attached informed consent documentation. Patient understands that multiple treatments may be necessary for optimum results and that on going maintenance with at-home products and additional office visits or treatments may be needed to enhance and extend the desired results. Post care was reviewed.\\n\\Kim questions questions were addressed.\\n\\nRTC:
Render Risk Assessment In Note?: no
Other (Free Text): Procedure Note:\\nComedonal Lesions located on the face were removed with a lancet and extractor. Prior to removal the treatment areas were prepped in the usual fashion.\\n\\nAcne Surgery with TheraClear\\n\\nPE: \\n\\nPrior to treatment, the procedure was reviewed in detail, including all but not limited to discomfort with manual extractions, mild erythema and edema immediately following treatment and post care. All questions were answered prior to administering the treatment.\\n\\nTreatment #\\nSite(s):\\n\\nEnergy:\\nVac Level:\\nPulse Mode:\\nPulse Width/ms:\\nTotal Passes:\\n\\nAcne surgery with Lancet and Comedone Extractor: Yes\\nIntralesional Injections with Kenalog preparation: No\\nConcentration: N/A\\nTotal Volume: N/A\\nProducts Applied: Obagi Pore Therapy and Obagi NuDerm Sun Shield SPF 50\\n\\Kim indications, treatment expectations (including management of any possible irritations), protocols, risks and benefits, pre/post care are reviewed. Additional information can be found on consent documentation. Patient understands that multiple treatments may be necessary for optimum results and that on going maintenance with at-home products and additional office visits or treatments may be needed to enhance and extend the desired results.\\n\\nStandard protocol was done. Reviewed with patient that ILS injections as well as manual extractions may be recommended for optimum results. These will be considered prn. Following treatment, the expected mild erythema was observed. Patient tolerated the procedure well without immediate bruising or complication. Post care was reviewed.\\n\\nRTC:

## 2023-07-10 NOTE — PROCEDURE: FILLERS
Map Statment: See Attach Map for Complete Details Complex Repair And Double M Plasty Text: The defect edges were debeveled with a #15 scalpel blade.  The primary defect was closed partially with a complex linear closure.  Given the location of the remaining defect, shape of the defect and the proximity to free margins a double M plasty was deemed most appropriate for complete closure of the defect.  Using a sterile surgical marker, an appropriate advancement flap was drawn incorporating the defect and placing the expected incisions within the relaxed skin tension lines where possible.    The area thus outlined was incised deep to adipose tissue with a #15 scalpel blade.  The skin margins were undermined to an appropriate distance in all directions utilizing iris scissors.

## 2023-07-17 ENCOUNTER — APPOINTMENT (OUTPATIENT)
Dept: URBAN - METROPOLITAN AREA CLINIC 211 | Age: 58
Setting detail: DERMATOLOGY
End: 2023-07-17

## 2023-07-17 DIAGNOSIS — Z41.9 ENCOUNTER FOR PROCEDURE FOR PURPOSES OTHER THAN REMEDYING HEALTH STATE, UNSPECIFIED: ICD-10-CM

## 2023-07-17 PROCEDURE — OTHER MIPS QUALITY: OTHER

## 2023-07-17 PROCEDURE — OTHER OTHER: OTHER

## 2023-07-17 NOTE — HPI: OTHER
Condition:: Cosmetic
Please Describe Your Condition:: Patient presents in office today for theraclear and Dermasilk treatment. No other questions or concerns. \\n\\nPain level 0/10

## 2023-07-17 NOTE — PROCEDURE: OTHER
Render Risk Assessment In Note?: no
Detail Level: Zone
Other (Free Text): Procedure Note:\\nComedonal Lesions located on the face were removed with a lancet and extractor. Prior to removal the treatment areas were prepped in the usual fashion.\\n\\nAcne Surgery with TheraClear\\n\\nPE: \\n\\nPrior to treatment, the procedure was reviewed in detail, including all but not limited to discomfort with manual extractions, mild erythema and edema immediately following treatment and post care. All questions were answered prior to administering the treatment.\\n\\nTreatment #\\nSite(s):\\n\\nEnergy:\\nVac Level:\\nPulse Mode:\\nPulse Width/ms:\\nTotal Passes:\\n\\nAcne surgery with Lancet and Comedone Extractor: Yes\\nIntralesional Injections with Kenalog preparation: No\\nConcentration: N/A\\nTotal Volume: N/A\\nProducts Applied: Obagi Pore Therapy and Obagi NuDerm Sun Shield SPF 50\\n\\Kim indications, treatment expectations (including management of any possible irritations), protocols, risks and benefits, pre/post care are reviewed. Additional information can be found on consent documentation. Patient understands that multiple treatments may be necessary for optimum results and that on going maintenance with at-home products and additional office visits or treatments may be needed to enhance and extend the desired results.\\n\\nStandard protocol was done. Reviewed with patient that ILS injections as well as manual extractions may be recommended for optimum results. These will be considered prn. Following treatment, the expected mild erythema was observed. Patient tolerated the procedure well without immediate bruising or complication. Post care was reviewed.\\n\\nRTC:
Note Text (......Xxx Chief Complaint.): This diagnosis correlates with the
Other (Free Text): Cosmetic\\n\\nPE:\\n\\nPrior to treatment, all but not limited to treatment indications and expectations(including management of any possible irritations) protocols, risks and benefits were reviewed in detail, including post treatment expectations. All questions were answered prior to administering the treatment.\\n\\nPlan: DermaSilk - Dermaplaning is performed first followed by The Briseida Glow.\\n\\nTreatment #:\\nTreatment Site:\\n\\nCleanse:\\n\\nAcetone:Yes\\n\\nDermaplaning:\\n\\nApplied per protocol utilizing a #10 blade at a 45-degree angle and with a gentle stroking motion.\\n\\nDiamond Glow Specifics:\\nSelected Serum:\\nLot#:\\nExp:\\nGrit Size:\\nApprox PSI:\\n\\nFollowed by Cherry Enzyme Peel occluded with a warm king towel. Once removed a Hydrotant Marine Mask is applied with a warm king towel. Each mask is gently massaged on and removed with in a few minutes or as tolerated.\\n\\nProducts applied:\\n\\nNotes:\\n\\nStandard protocol was applied. All indications, treatment expectations (including management of any possible irritations), protocols, risks and benefits, pre/post care are reviewed. Details of these can be found on the appropriate attached informed consent documentation. Patient understands that multiple treatments may be necessary for optimum results and that on going maintenance with at-home products and additional office visits or treatments may be needed to enhance and extend the desired results. Post care was reviewed.\\n\\Kim questions questions were addressed.\\n\\nRTC:

## 2023-08-28 ENCOUNTER — APPOINTMENT (OUTPATIENT)
Dept: URBAN - METROPOLITAN AREA CLINIC 211 | Age: 58
Setting detail: DERMATOLOGY
End: 2023-08-28

## 2023-08-28 DIAGNOSIS — Z41.9 ENCOUNTER FOR PROCEDURE FOR PURPOSES OTHER THAN REMEDYING HEALTH STATE, UNSPECIFIED: ICD-10-CM

## 2023-08-28 PROCEDURE — OTHER OTHER: OTHER

## 2023-08-28 PROCEDURE — OTHER INVENTORY: OTHER

## 2023-08-28 PROCEDURE — OTHER MIPS QUALITY: OTHER

## 2023-08-28 NOTE — HPI: OTHER
Condition:: Cosmetic
Please Describe Your Condition:: is an established patient who is being seen for a chief complaint of Cosmetic. Theraclear and Dermasilk. She has no other skin concerns at this time.

## 2023-08-28 NOTE — PROCEDURE: OTHER
Note Text (......Xxx Chief Complaint.): This diagnosis correlates with the
Render Risk Assessment In Note?: no
Detail Level: Zone
Other (Free Text): Procedure Note:\\nComedonal Lesions located on the face were removed with a lancet and extractor. Prior to removal the treatment areas were prepped in the usual fashion.\\n\\nAcne Surgery with TheraClear\\n\\nPE: \\n\\nPrior to treatment, the procedure was reviewed in detail, including all but not limited to discomfort with manual extractions, mild erythema and edema immediately following treatment and post care. All questions were answered prior to administering the treatment.\\n\\nTreatment #\\nSite(s):\\n\\nEnergy:\\nVac Level:\\nPulse Mode:\\nPulse Width/ms:\\nTotal Passes:\\n\\nAcne surgery with Lancet and Comedone Extractor: Yes\\nIntralesional Injections with Kenalog preparation: No\\nConcentration: N/A\\nTotal Volume: N/A\\nProducts Applied: Obagi Pore Therapy and Obagi NuDerm Sun Shield SPF 50\\n\\Kim indications, treatment expectations (including management of any possible irritations), protocols, risks and benefits, pre/post care are reviewed. Additional information can be found on consent documentation. Patient understands that multiple treatments may be necessary for optimum results and that on going maintenance with at-home products and additional office visits or treatments may be needed to enhance and extend the desired results.\\n\\nStandard protocol was done. Reviewed with patient that ILS injections as well as manual extractions may be recommended for optimum results. These will be considered prn. Following treatment, the expected mild erythema was observed. Patient tolerated the procedure well without immediate bruising or complication. Post care was reviewed.\\n\\nRTC:
Other (Free Text): Cosmetic\\n\\nPE:\\n\\nPrior to treatment, all but not limited to treatment indications and expectations(including management of any possible irritations) protocols, risks and benefits were reviewed in detail, including post treatment expectations. All questions were answered prior to administering the treatment.\\n\\nPlan: DermaSilk - Dermaplaning is performed first followed by The Briseida Glow.\\n\\nTreatment #:\\nTreatment Site:\\n\\nCleanse:\\n\\nAcetone:Yes\\n\\nDermaplaning:\\n\\nApplied per protocol utilizing a #10 blade at a 45-degree angle and with a gentle stroking motion.\\n\\nDiamond Glow Specifics:\\nSelected Serum:\\nLot#:\\nExp:\\nGrit Size:\\nApprox PSI:\\n\\nFollowed by Cherry Enzyme Peel occluded with a warm king towel. Once removed a Hydrotant Marine Mask is applied with a warm king towel. Each mask is gently massaged on and removed with in a few minutes or as tolerated.\\n\\nProducts applied:\\n\\nNotes:\\n\\nStandard protocol was applied. All indications, treatment expectations (including management of any possible irritations), protocols, risks and benefits, pre/post care are reviewed. Details of these can be found on the appropriate attached informed consent documentation. Patient understands that multiple treatments may be necessary for optimum results and that on going maintenance with at-home products and additional office visits or treatments may be needed to enhance and extend the desired results. Post care was reviewed.\\n\\Kim questions questions were addressed.\\n\\nRTC:

## 2023-09-12 ENCOUNTER — APPOINTMENT (OUTPATIENT)
Dept: URBAN - METROPOLITAN AREA CLINIC 211 | Age: 58
Setting detail: DERMATOLOGY
End: 2023-09-12

## 2023-09-12 DIAGNOSIS — L98.8 OTHER SPECIFIED DISORDERS OF THE SKIN AND SUBCUTANEOUS TISSUE: ICD-10-CM

## 2023-09-12 PROCEDURE — OTHER COUNSELING: OTHER

## 2023-09-12 PROCEDURE — OTHER MIPS QUALITY: OTHER

## 2023-09-12 PROCEDURE — OTHER BOTOX: OTHER

## 2023-09-12 PROCEDURE — OTHER DAXXIFY: OTHER

## 2023-09-12 ASSESSMENT — LOCATION DETAILED DESCRIPTION DERM: LOCATION DETAILED: INFERIOR MID FOREHEAD

## 2023-09-12 ASSESSMENT — LOCATION SIMPLE DESCRIPTION DERM: LOCATION SIMPLE: INFERIOR FOREHEAD

## 2023-09-12 ASSESSMENT — LOCATION ZONE DERM: LOCATION ZONE: FACE

## 2023-09-12 NOTE — PROCEDURE: DAXXIFY
Right Pupillary Line Units: 0
Show Nasal Units: Yes
Show Right And Left Brow Units: No
Consent: Written consent obtained. Risks include but not limited to lid/brow ptosis, bruising, swelling, diplopia, temporary effect, incomplete chemical denervation.
Post-Care Instructions: Patient instructed to not lie down for 4 hours and limit physical activity for 24 hours.
Detail Level: Detailed
Dilution (U/0.1 Cc): 4
Show Inventory Tab: Show
Glabellar Complex Units: 40

## 2023-09-12 NOTE — PROCEDURE: BOTOX
Lateral Platysmal Bands Units: 0
Show Additional Area 6: No
Detail Level: Zone
Show Nasal Units: Yes
Consent: Written consent obtained. Risks include but not limited to lid/brow ptosis, bruising, swelling, diplopia, temporary effect, incomplete chemical denervation.
Post-Care Instructions: Patient instructed to not lie down for 4 hours and limit physical activity for 24 hours.
Periorbital Skin Units: 12
Dilution (U/0.1 Cc): 4
Price (Use Numbers Only, No Special Characters Or $): 13
Forehead Units: 10
Incrementing Botox Units: By 0.5 Units
Show Inventory Tab: Show

## 2023-10-30 ENCOUNTER — APPOINTMENT (OUTPATIENT)
Dept: URBAN - METROPOLITAN AREA CLINIC 211 | Age: 58
Setting detail: DERMATOLOGY
End: 2023-11-01

## 2023-10-30 DIAGNOSIS — L72.0 EPIDERMAL CYST: ICD-10-CM

## 2023-10-30 DIAGNOSIS — L72.8 OTHER FOLLICULAR CYSTS OF THE SKIN AND SUBCUTANEOUS TISSUE: ICD-10-CM

## 2023-10-30 PROCEDURE — OTHER DEFER: OTHER

## 2023-10-30 PROCEDURE — OTHER REASSURANCE: OTHER

## 2023-10-30 PROCEDURE — OTHER MIPS QUALITY: OTHER

## 2023-10-30 PROCEDURE — 99213 OFFICE O/P EST LOW 20 MIN: CPT

## 2023-10-30 PROCEDURE — OTHER COUNSELING: OTHER

## 2023-10-30 ASSESSMENT — LOCATION DETAILED DESCRIPTION DERM
LOCATION DETAILED: LEFT LABIUM MINUS
LOCATION DETAILED: RIGHT LATERAL ABDOMEN
LOCATION DETAILED: RIGHT LABIUM MINUS

## 2023-10-30 ASSESSMENT — LOCATION SIMPLE DESCRIPTION DERM
LOCATION SIMPLE: LABIA MINORA
LOCATION SIMPLE: ABDOMEN

## 2023-10-30 ASSESSMENT — LOCATION ZONE DERM
LOCATION ZONE: TRUNK
LOCATION ZONE: VULVA

## 2023-10-30 NOTE — PROCEDURE: DEFER
X Size Of Lesion In Cm (Optional): 0
Introduction Text (Please End With A Colon): The following procedure was deferred:
Detail Level: Detailed
Procedure To Be Performed At Next Visit: Acne Surgery
Instructions (Optional): Milia extractions, 30 min with ACN

## 2023-10-30 NOTE — PROCEDURE: REASSURANCE
Additional Notes (Optional): Reassured area is resolving
Hide Additional Notes?: No
Detail Level: Simple

## 2023-11-03 ENCOUNTER — APPOINTMENT (OUTPATIENT)
Dept: URBAN - METROPOLITAN AREA CLINIC 211 | Age: 58
Setting detail: DERMATOLOGY
End: 2023-11-06

## 2023-11-03 DIAGNOSIS — L72.0 EPIDERMAL CYST: ICD-10-CM

## 2023-11-03 PROCEDURE — OTHER MIPS QUALITY: OTHER

## 2023-11-03 PROCEDURE — OTHER COUNSELING: OTHER

## 2023-11-03 PROCEDURE — OTHER OTHER: OTHER

## 2023-11-03 PROCEDURE — 10040 ACNE SURGERY: CPT

## 2023-11-03 PROCEDURE — OTHER ACNE SURGERY: OTHER

## 2023-11-03 ASSESSMENT — LOCATION DETAILED DESCRIPTION DERM
LOCATION DETAILED: RIGHT LABIUM MINUS
LOCATION DETAILED: LEFT LABIUM MINUS
LOCATION DETAILED: LEFT LABIUM MAJUS
LOCATION DETAILED: LEFT LABIA MINORA
LOCATION DETAILED: RIGHT LABIA MINORA

## 2023-11-03 ASSESSMENT — LOCATION SIMPLE DESCRIPTION DERM
LOCATION SIMPLE: VULVA
LOCATION SIMPLE: LABIA MINORA
LOCATION SIMPLE: LABIA MAJORA

## 2023-11-03 ASSESSMENT — LOCATION ZONE DERM: LOCATION ZONE: VULVA

## 2023-11-03 NOTE — PROCEDURE: OTHER
Render Risk Assessment In Note?: no
Note Text (......Xxx Chief Complaint.): This diagnosis correlates with the
Other (Free Text): A comedone extractor was also used to extract the Milia.\\n\\n30 mintues was spent with patient today performing extractions. All areas were prepped with isopropyl alcohol. CTA and comedo extractor were used to extract. Some lesions were anesthetized with local 1% lidocaine and epinephrine for comfort.
Detail Level: Zone

## 2023-11-03 NOTE — PROCEDURE: ACNE SURGERY
Prep Text (Optional): Prior to removal the treatment areas were prepped in the usual fashion.
Hemostasis: Pressure
Post-Care Instructions: I reviewed with the patient in detail post-care instructions. Patient is to keep the treatment areas dry overnight, and then apply bacitracin twice daily until healed. Patient may apply hydrogen peroxide soaks to remove any crusting.
Acne Type: Cysts
Extraction Method: comedo extractor
Render Post-Care Instructions In Note?: yes
Consent was obtained and risks were reviewed including but not limited to scarring, infection, bleeding, scabbing, incomplete removal, and allergy to anesthesia.
Detail Level: Detailed

## 2023-11-13 ENCOUNTER — APPOINTMENT (OUTPATIENT)
Dept: URBAN - METROPOLITAN AREA CLINIC 211 | Age: 58
Setting detail: DERMATOLOGY
End: 2023-11-13

## 2023-11-13 ENCOUNTER — RX ONLY (RX ONLY)
Age: 58
End: 2023-11-13

## 2023-11-13 DIAGNOSIS — L72.0 EPIDERMAL CYST: ICD-10-CM

## 2023-11-13 DIAGNOSIS — L0390 CELLULITIS AND ABSCESS OF UNSPECIFIED SITES: ICD-10-CM

## 2023-11-13 DIAGNOSIS — L0391 CELLULITIS AND ABSCESS OF UNSPECIFIED SITES: ICD-10-CM

## 2023-11-13 PROBLEM — N76.4 ABSCESS OF VULVA: Status: ACTIVE | Noted: 2023-11-13

## 2023-11-13 PROCEDURE — OTHER PRESCRIPTION: OTHER

## 2023-11-13 PROCEDURE — 99213 OFFICE O/P EST LOW 20 MIN: CPT | Mod: 24

## 2023-11-13 PROCEDURE — OTHER PRESCRIPTION MEDICATION MANAGEMENT: OTHER

## 2023-11-13 PROCEDURE — OTHER COUNSELING: OTHER

## 2023-11-13 PROCEDURE — OTHER TREATMENT REGIMEN: OTHER

## 2023-11-13 PROCEDURE — OTHER MIPS QUALITY: OTHER

## 2023-11-13 RX ORDER — MUPIROCIN 20 MG/G
OINTMENT TOPICAL
Qty: 15 | Refills: 0 | Status: ERX | COMMUNITY
Start: 2023-11-13

## 2023-11-13 RX ORDER — DOXYCYCLINE HYCLATE 100 MG/1
TABLET, COATED ORAL
Qty: 28 | Refills: 0 | Status: ERX | COMMUNITY
Start: 2023-11-13

## 2023-11-13 RX ORDER — DOXYCYCLINE HYCLATE 100 MG/1
TABLET, COATED ORAL
Qty: 28 | Refills: 0 | Status: ERX

## 2023-11-13 ASSESSMENT — LOCATION DETAILED DESCRIPTION DERM
LOCATION DETAILED: RIGHT LABIUM MINUS
LOCATION DETAILED: LEFT LABIA MINORA

## 2023-11-13 ASSESSMENT — LOCATION SIMPLE DESCRIPTION DERM
LOCATION SIMPLE: VULVA
LOCATION SIMPLE: LABIA MINORA

## 2023-11-13 ASSESSMENT — LOCATION ZONE DERM: LOCATION ZONE: VULVA

## 2023-11-13 NOTE — PROCEDURE: PRESCRIPTION MEDICATION MANAGEMENT
Pls review.  Thanks.
Received an incoming fax from [facility] PRESCRIBER RESPONSE FORM to be completed. Scanned into chart sent to Dr. Andrew laureano  
Plan: 1. Recommended warm baths as needed\\n2. Start doxycycline hyclate 100 mg tablet, take 2 tablet PO bid x14 days with food and water\\n3. Start mupirocin 2 % topical ointment, apply to lesion twice daily at bedtime for 7 days\\n\\nInform surgeon of abscess as discussed
Render In Strict Bullet Format?: Yes
Detail Level: Zone

## 2023-11-27 ENCOUNTER — APPOINTMENT (OUTPATIENT)
Dept: URBAN - METROPOLITAN AREA CLINIC 211 | Age: 58
Setting detail: DERMATOLOGY
End: 2023-11-27

## 2023-11-27 DIAGNOSIS — L72.0 EPIDERMAL CYST: ICD-10-CM

## 2023-11-27 DIAGNOSIS — L0391 CELLULITIS AND ABSCESS OF UNSPECIFIED SITES: ICD-10-CM

## 2023-11-27 DIAGNOSIS — L0390 CELLULITIS AND ABSCESS OF UNSPECIFIED SITES: ICD-10-CM

## 2023-11-27 PROBLEM — L02.91 CUTANEOUS ABSCESS, UNSPECIFIED: Status: ACTIVE | Noted: 2023-11-27

## 2023-11-27 PROCEDURE — OTHER COUNSELING: OTHER

## 2023-11-27 PROCEDURE — OTHER ACNE SURGERY COSMETIC: OTHER

## 2023-11-27 PROCEDURE — OTHER PRESCRIPTION: OTHER

## 2023-11-27 PROCEDURE — OTHER MIPS QUALITY: OTHER

## 2023-11-27 PROCEDURE — 99213 OFFICE O/P EST LOW 20 MIN: CPT

## 2023-11-27 RX ORDER — DOXYCYCLINE HYCLATE 100 MG/1
TABLET, COATED ORAL
Qty: 60 | Refills: 0 | Status: ERX

## 2023-11-27 ASSESSMENT — LOCATION DETAILED DESCRIPTION DERM
LOCATION DETAILED: LEFT LABIUM MINUS
LOCATION DETAILED: RIGHT LABIUM MAJUS
LOCATION DETAILED: LEFT LABIUM MAJUS

## 2023-11-27 ASSESSMENT — SEVERITY ASSESSMENT: SEVERITY: CLEAR

## 2023-11-27 ASSESSMENT — LOCATION SIMPLE DESCRIPTION DERM
LOCATION SIMPLE: LABIA MINORA
LOCATION SIMPLE: LABIA MAJORA

## 2023-11-27 ASSESSMENT — LOCATION ZONE DERM: LOCATION ZONE: VULVA

## 2023-11-27 NOTE — PROCEDURE: ACNE SURGERY COSMETIC
Acne Type: Comedonal Lesions
Consent was obtained and risks were reviewed including but not limited to scarring, infection, bleeding, scabbing, incomplete removal, and allergy to anesthesia.
Detail Level: Detailed
Render Number Of Lesions Treated: yes
Post-Care Instructions: I reviewed with the patient in detail post-care instructions. Patient is to keep the treatment areas dry overnight, and then apply bacitracin twice daily until healed. Patient may apply hydrogen peroxide soaks to remove any crusting.
Extraction Method: 11 blade and q-tip
Hemostasis: Pressure
Prep Text (Optional): Prior to removal the treatment areas were prepped in the usual fashion.
Render Post-Care Instructions In Note?: no

## 2024-04-15 ENCOUNTER — APPOINTMENT (OUTPATIENT)
Dept: URBAN - METROPOLITAN AREA CLINIC 211 | Age: 59
Setting detail: DERMATOLOGY
End: 2024-04-15

## 2024-04-15 DIAGNOSIS — D485 NEOPLASM OF UNCERTAIN BEHAVIOR OF SKIN: ICD-10-CM

## 2024-04-15 PROBLEM — D48.5 NEOPLASM OF UNCERTAIN BEHAVIOR OF SKIN: Status: ACTIVE | Noted: 2024-04-15

## 2024-04-15 PROCEDURE — OTHER MIPS QUALITY: OTHER

## 2024-04-15 PROCEDURE — 11310 SHAVE SKIN LESION 0.5 CM/<: CPT

## 2024-04-15 PROCEDURE — OTHER SHAVE REMOVAL: OTHER

## 2024-04-15 PROCEDURE — OTHER COUNSELING: OTHER

## 2024-04-15 ASSESSMENT — LOCATION ZONE DERM: LOCATION ZONE: FACE

## 2024-04-15 ASSESSMENT — LOCATION DETAILED DESCRIPTION DERM: LOCATION DETAILED: LEFT LATERAL FOREHEAD

## 2024-04-15 ASSESSMENT — LOCATION SIMPLE DESCRIPTION DERM: LOCATION SIMPLE: LEFT FOREHEAD

## 2024-04-15 NOTE — PROCEDURE: SHAVE REMOVAL
Medical Necessity Information: It is in your best interest to select a reason for this procedure from the list below. All of these items fulfill various CMS LCD requirements except the new and changing color options.
Medical Necessity Clause: This procedure was medically necessary because the lesion that was treated was:
Lab: -[id number]
Lab Facility: [id number]
Detail Level: Detailed
Was A Bandage Applied: Yes
Size Of Lesion In Cm (Required): 0.5
Depth Of Shave: dermis
Biopsy Method: Dermablade
Anesthesia Type: 1% lidocaine with epinephrine
Hemostasis: Drysol
Wound Care: Petrolatum
Render Path Notes In Note?: No
Consent was obtained from the patient. The risks and benefits to therapy were discussed in detail. Specifically, the risks of infection, scarring, bleeding, prolonged wound healing, incomplete removal, allergy to anesthesia, nerve injury and recurrence were addressed. Prior to the procedure, the treatment site was clearly identified and confirmed by the patient. All components of Universal Protocol/PAUSE Rule completed.
Post-Care Instructions: I reviewed with the patient in detail post-care instructions. Patient is to keep the biopsy site dry overnight, and then apply bacitracin twice daily until healed. Patient may apply hydrogen peroxide soaks to remove any crusting.
Notification Instructions: Patient will be notified of pathology results. However, patient instructed to call the office if not contacted within 2 weeks.
Billing Type: Third-Party Bill

## 2024-04-23 ENCOUNTER — APPOINTMENT (OUTPATIENT)
Dept: URBAN - METROPOLITAN AREA CLINIC 211 | Age: 59
Setting detail: DERMATOLOGY
End: 2024-04-23

## 2024-04-23 DIAGNOSIS — L72.0 EPIDERMAL CYST: ICD-10-CM

## 2024-04-23 DIAGNOSIS — B07.8 OTHER VIRAL WARTS: ICD-10-CM

## 2024-04-23 PROCEDURE — OTHER LIQUID NITROGEN: OTHER

## 2024-04-23 PROCEDURE — 17110 DESTRUCT B9 LESION 1-14: CPT

## 2024-04-23 PROCEDURE — OTHER MIPS QUALITY: OTHER

## 2024-04-23 PROCEDURE — OTHER COUNSELING: OTHER

## 2024-04-23 PROCEDURE — 10040 ACNE SURGERY: CPT

## 2024-04-23 PROCEDURE — OTHER ACNE SURGERY: OTHER

## 2024-04-23 ASSESSMENT — LOCATION DETAILED DESCRIPTION DERM
LOCATION DETAILED: LEFT LABIA MAJORA
LOCATION DETAILED: RIGHT DISTAL RADIAL DORSAL INDEX FINGER

## 2024-04-23 ASSESSMENT — LOCATION ZONE DERM
LOCATION ZONE: VULVA
LOCATION ZONE: FINGER

## 2024-04-23 ASSESSMENT — LOCATION SIMPLE DESCRIPTION DERM
LOCATION SIMPLE: VULVA
LOCATION SIMPLE: RIGHT INDEX FINGER

## 2024-04-23 NOTE — HPI: SKIN LESION
What Type Of Note Output Would You Prefer (Optional)?: Bullet Format
How Severe Is Your Skin Lesion?: moderate
treated_been_treated
Is This A New Presentation, Or A Follow-Up?: Skin Lesion
When Was It Treated?: 2023

## 2024-04-23 NOTE — PROCEDURE: ACNE SURGERY
Prep Text (Optional): Prior to removal the treatment areas were prepped in the usual fashion.
Acne Type: Comedonal Lesions
Consent was obtained and risks were reviewed including but not limited to scarring, infection, bleeding, scabbing, incomplete removal, and allergy to anesthesia.
Render Post-Care Instructions In Note?: yes
Extraction Method: lancet and q-tip
Detail Level: Detailed
Post-Care Instructions: I reviewed with the patient in detail post-care instructions. Patient is to keep the treatment areas dry overnight, and then apply bacitracin twice daily until healed. Patient may apply hydrogen peroxide soaks to remove any crusting.

## 2024-04-23 NOTE — PROCEDURE: LIQUID NITROGEN
I called Cosme following her consult with Dr. Barnhart yesterday. She said her appointment went well. Her sister in law, Tyesha, was able to join the appointment by phone. Cosme really has no additional questions at this time. We reviewed future appts together. I reminded her of my role and the assistance I can provide. I welcomed calls and verified she has my contact information on hand. She was appreciative of the call.  
Medical Necessity Clause: This procedure was medically necessary because the lesions that were treated were:
Spray Paint Technique: No
Number Of Freeze-Thaw Cycles: 3 freeze-thaw cycles
Spray Paint Text: The liquid nitrogen was applied to the skin utilizing a spray paint frosting technique.
Post-Care Instructions: I reviewed with the patient in detail post-care instructions. Patient is to wear sunprotection, and avoid picking at any of the treated lesions. Pt may apply Vaseline to crusted or scabbing areas.
Show Applicator Variable?: Yes
Detail Level: Detailed
Duration Of Freeze Thaw-Cycle (Seconds): 5-10
Medical Necessity Information: It is in your best interest to select a reason for this procedure from the list below. All of these items fulfill various CMS LCD requirements except the new and changing color options.
Consent: The patient's consent was obtained including but not limited to risks of crusting, scabbing, blistering, scarring, darker or lighter pigmentary change, recurrence, incomplete removal and infection.

## 2024-10-01 ENCOUNTER — APPOINTMENT (OUTPATIENT)
Dept: URBAN - METROPOLITAN AREA CLINIC 211 | Age: 59
Setting detail: DERMATOLOGY
End: 2024-10-01

## 2024-10-01 DIAGNOSIS — L0390 CELLULITIS AND ABSCESS OF UNSPECIFIED SITES: ICD-10-CM

## 2024-10-01 DIAGNOSIS — D485 NEOPLASM OF UNCERTAIN BEHAVIOR OF SKIN: ICD-10-CM

## 2024-10-01 DIAGNOSIS — L0391 CELLULITIS AND ABSCESS OF UNSPECIFIED SITES: ICD-10-CM

## 2024-10-01 PROBLEM — D37.01 NEOPLASM OF UNCERTAIN BEHAVIOR OF LIP: Status: ACTIVE | Noted: 2024-10-01

## 2024-10-01 PROBLEM — L02.415 CUTANEOUS ABSCESS OF RIGHT LOWER LIMB: Status: ACTIVE | Noted: 2024-10-01

## 2024-10-01 PROCEDURE — OTHER BIOPSY BY SHAVE METHOD: OTHER

## 2024-10-01 PROCEDURE — 99213 OFFICE O/P EST LOW 20 MIN: CPT | Mod: 25

## 2024-10-01 PROCEDURE — OTHER COUNSELING: OTHER

## 2024-10-01 PROCEDURE — OTHER PRESCRIPTION MEDICATION MANAGEMENT: OTHER

## 2024-10-01 PROCEDURE — OTHER MIPS QUALITY: OTHER

## 2024-10-01 PROCEDURE — 40490 BIOPSY OF LIP: CPT

## 2024-10-01 ASSESSMENT — LOCATION DETAILED DESCRIPTION DERM
LOCATION DETAILED: RIGHT ANTERIOR MEDIAL PROXIMAL THIGH
LOCATION DETAILED: LEFT INFERIOR VERMILION LIP

## 2024-10-01 ASSESSMENT — LOCATION ZONE DERM
LOCATION ZONE: LEG
LOCATION ZONE: LIP

## 2024-10-01 ASSESSMENT — LOCATION SIMPLE DESCRIPTION DERM
LOCATION SIMPLE: LEFT LIP
LOCATION SIMPLE: RIGHT THIGH

## 2024-10-01 NOTE — PROCEDURE: BIOPSY BY SHAVE METHOD
Detail Level: Detailed
Depth Of Biopsy: dermis
Was A Bandage Applied: Yes
Size Of Lesion In Cm: 0.3
X Size Of Lesion In Cm: 0
Anticipated Plan (Based On Presumed Biopsy Results): Only call if concerning
Biopsy Type: H and E
Biopsy Method: Dermablade
Anesthesia Type: 1% lidocaine with epinephrine and a 1:10 solution of 8.4% sodium bicarbonate
Anesthesia Volume In Cc: 0.5
Hemostasis: Aluminum Chloride
Wound Care: Petrolatum
Dressing: bandage
Destruction After The Procedure: No
Type Of Destruction Used: Curettage
Curettage Text: The wound bed was treated with curettage after the biopsy was performed.
Cryotherapy Text: The wound bed was treated with cryotherapy after the biopsy was performed.
Electrodesiccation Text: The wound bed was treated with electrodesiccation after the biopsy was performed.
Electrodesiccation And Curettage Text: The wound bed was treated with electrodesiccation and curettage after the biopsy was performed.
Silver Nitrate Text: The wound bed was treated with silver nitrate after the biopsy was performed.
Lab: -[id number]
Consent: Written consent was obtained and risks were reviewed including but not limited to scarring, infection, bleeding, scabbing, incomplete removal, nerve damage and allergy to anesthesia.
Post-Care Instructions: I reviewed with the patient in detail post-care instructions. Patient is to keep the biopsy site dry overnight, and then apply bacitracin twice daily until healed. Patient may apply hydrogen peroxide soaks to remove any crusting.
Notification Instructions: Patient will be notified of biopsy results. However, patient instructed to call the office if not contacted within 2 weeks.
Billing Type: Third-Party Bill
Information: Selecting Yes will display possible errors in your note based on the variables you have selected. This validation is only offered as a suggestion for you. PLEASE NOTE THAT THE VALIDATION TEXT WILL BE REMOVED WHEN YOU FINALIZE YOUR NOTE. IF YOU WANT TO FAX A PRELIMINARY NOTE YOU WILL NEED TO TOGGLE THIS TO 'NO' IF YOU DO NOT WANT IT IN YOUR FAXED NOTE.

## 2024-10-01 NOTE — PROCEDURE: PRESCRIPTION MEDICATION MANAGEMENT
Detail Level: Detailed
Plan: 1.  Take Doryx 60 mg take 1 pill twice a day x 6 days (samples given)\\n\\nArea is resolving and no longer fluctuant. Start doxy RTC in 2 weeks if not improved as discussed
Render In Strict Bullet Format?: Yes

## 2025-04-01 ENCOUNTER — APPOINTMENT (OUTPATIENT)
Dept: URBAN - METROPOLITAN AREA CLINIC 212 | Age: 60
Setting detail: DERMATOLOGY
End: 2025-04-01

## 2025-04-01 DIAGNOSIS — Z41.9 ENCOUNTER FOR PROCEDURE FOR PURPOSES OTHER THAN REMEDYING HEALTH STATE, UNSPECIFIED: ICD-10-CM

## 2025-04-01 PROCEDURE — OTHER SILKPEEL DERMALINFUSION: OTHER

## 2025-04-01 ASSESSMENT — LOCATION ZONE DERM
LOCATION ZONE: FACE
LOCATION ZONE: FACE

## 2025-04-01 ASSESSMENT — LOCATION DETAILED DESCRIPTION DERM
LOCATION DETAILED: INFERIOR MID FOREHEAD
LOCATION DETAILED: LEFT INFERIOR MEDIAL FOREHEAD

## 2025-04-01 ASSESSMENT — LOCATION SIMPLE DESCRIPTION DERM
LOCATION SIMPLE: LEFT FOREHEAD
LOCATION SIMPLE: INFERIOR FOREHEAD

## 2025-04-01 NOTE — PROCEDURE: SILKPEEL DERMALINFUSION
Number Of Passes: 4
Body Treatment Head Used?: Not Used
Post-Care Instructions: I reviewed with the patient in detail post-care instructions. Patient should stay away from the sun and wear sun protection until treated areas are fully healed.
Detail Level: Zone
Intelliflow Settin%
Price (Use Numbers Only, No Special Characters Or $): 220
Consent: Written consent obtained, risks reviewed including but not limited to crusting, scabbing, blistering, scarring, darker or lighter pigmentary change, bruising, and/or incomplete response.
Facial Treatment Head Used?: Facial: Medium (80 grit) 9 mm
Vacuum Pressure In Inches: 3.5
Endpoint: mild erythema
Solution Used: Vitamin C Skin Hydrating Solution